# Patient Record
Sex: FEMALE | Race: BLACK OR AFRICAN AMERICAN | NOT HISPANIC OR LATINO | Employment: OTHER | URBAN - METROPOLITAN AREA
[De-identification: names, ages, dates, MRNs, and addresses within clinical notes are randomized per-mention and may not be internally consistent; named-entity substitution may affect disease eponyms.]

---

## 2017-10-02 ENCOUNTER — HOSPITAL ENCOUNTER (INPATIENT)
Facility: HOSPITAL | Age: 32
LOS: 1 days | Discharge: HOME | DRG: 640 | End: 2017-10-03
Attending: EMERGENCY MEDICINE | Admitting: FAMILY MEDICINE
Payer: MEDICARE

## 2017-10-02 DIAGNOSIS — R18.8 CIRRHOSIS OF LIVER WITH ASCITES, UNSPECIFIED HEPATIC CIRRHOSIS TYPE: ICD-10-CM

## 2017-10-02 DIAGNOSIS — I10 ESSENTIAL HYPERTENSION: ICD-10-CM

## 2017-10-02 DIAGNOSIS — Z99.2 ESRD ON HEMODIALYSIS: ICD-10-CM

## 2017-10-02 DIAGNOSIS — R79.89 ELEVATED TROPONIN: ICD-10-CM

## 2017-10-02 DIAGNOSIS — B20 HIV (HUMAN IMMUNODEFICIENCY VIRUS INFECTION): ICD-10-CM

## 2017-10-02 DIAGNOSIS — K74.60 CIRRHOSIS OF LIVER WITH ASCITES, UNSPECIFIED HEPATIC CIRRHOSIS TYPE: ICD-10-CM

## 2017-10-02 DIAGNOSIS — J18.9 HCAP (HEALTHCARE-ASSOCIATED PNEUMONIA): ICD-10-CM

## 2017-10-02 DIAGNOSIS — R56.9 SEIZURES: Primary | ICD-10-CM

## 2017-10-02 DIAGNOSIS — N18.6 ESRD ON HEMODIALYSIS: ICD-10-CM

## 2017-10-02 LAB
ALBUMIN SERPL BCP-MCNC: 3.2 G/DL
ALP SERPL-CCNC: 218 U/L
ALT SERPL W/O P-5'-P-CCNC: 15 U/L
ANION GAP SERPL CALC-SCNC: 15 MMOL/L
APTT BLDCRRT: 28.1 SEC
AST SERPL-CCNC: 35 U/L
BASOPHILS # BLD AUTO: 0.01 K/UL
BASOPHILS # BLD AUTO: 0.02 K/UL
BASOPHILS NFR BLD: 0.2 %
BASOPHILS NFR BLD: 0.3 %
BILIRUB SERPL-MCNC: 0.6 MG/DL
BNP SERPL-MCNC: >4900 PG/ML
BUN SERPL-MCNC: 25 MG/DL
CALCIUM SERPL-MCNC: 9.9 MG/DL
CHLORIDE SERPL-SCNC: 100 MMOL/L
CO2 SERPL-SCNC: 23 MMOL/L
CREAT SERPL-MCNC: 5.6 MG/DL
DIFFERENTIAL METHOD: ABNORMAL
DIFFERENTIAL METHOD: ABNORMAL
EOSINOPHIL # BLD AUTO: 0.1 K/UL
EOSINOPHIL # BLD AUTO: 0.1 K/UL
EOSINOPHIL NFR BLD: 1.7 %
EOSINOPHIL NFR BLD: 1.9 %
ERYTHROCYTE [DISTWIDTH] IN BLOOD BY AUTOMATED COUNT: 17.9 %
ERYTHROCYTE [DISTWIDTH] IN BLOOD BY AUTOMATED COUNT: 18.1 %
EST. GFR  (AFRICAN AMERICAN): 11 ML/MIN/1.73 M^2
EST. GFR  (NON AFRICAN AMERICAN): 9 ML/MIN/1.73 M^2
GLUCOSE SERPL-MCNC: 77 MG/DL
HCG INTACT+B SERPL-ACNC: <1.2 MIU/ML
HCT VFR BLD AUTO: 36.5 %
HCT VFR BLD AUTO: 38.5 %
HGB BLD-MCNC: 11.2 G/DL
HGB BLD-MCNC: 11.5 G/DL
INR PPP: 1.1
LACTATE SERPL-SCNC: 1.1 MMOL/L
LYMPHOCYTES # BLD AUTO: 1.2 K/UL
LYMPHOCYTES # BLD AUTO: 1.2 K/UL
LYMPHOCYTES NFR BLD: 17.7 %
LYMPHOCYTES NFR BLD: 18.5 %
MCH RBC QN AUTO: 26.7 PG
MCH RBC QN AUTO: 27 PG
MCHC RBC AUTO-ENTMCNC: 29.9 G/DL
MCHC RBC AUTO-ENTMCNC: 30.7 G/DL
MCV RBC AUTO: 88 FL
MCV RBC AUTO: 89 FL
MONOCYTES # BLD AUTO: 0.6 K/UL
MONOCYTES # BLD AUTO: 1 K/UL
MONOCYTES NFR BLD: 14.2 %
MONOCYTES NFR BLD: 9.9 %
NEUTROPHILS # BLD AUTO: 4.4 K/UL
NEUTROPHILS # BLD AUTO: 4.6 K/UL
NEUTROPHILS NFR BLD: 66.1 %
NEUTROPHILS NFR BLD: 69.5 %
PLATELET # BLD AUTO: 167 K/UL
PLATELET # BLD AUTO: 178 K/UL
PMV BLD AUTO: 10.2 FL
PMV BLD AUTO: 10.3 FL
POTASSIUM SERPL-SCNC: 5 MMOL/L
PROT SERPL-MCNC: 8.3 G/DL
PROTHROMBIN TIME: 11.7 SEC
RBC # BLD AUTO: 4.15 M/UL
RBC # BLD AUTO: 4.31 M/UL
SODIUM SERPL-SCNC: 138 MMOL/L
TROPONIN I SERPL DL<=0.01 NG/ML-MCNC: 0.04 NG/ML
TROPONIN I SERPL DL<=0.01 NG/ML-MCNC: 0.05 NG/ML
TROPONIN I SERPL DL<=0.01 NG/ML-MCNC: 0.05 NG/ML
WBC # BLD AUTO: 6.38 K/UL
WBC # BLD AUTO: 6.96 K/UL

## 2017-10-02 PROCEDURE — 80100016 HC MAINTENANCE HEMODIALYSIS

## 2017-10-02 PROCEDURE — 93010 ELECTROCARDIOGRAM REPORT: CPT | Mod: ,,, | Performed by: INTERNAL MEDICINE

## 2017-10-02 PROCEDURE — 63600175 PHARM REV CODE 636 W HCPCS: Performed by: INTERNAL MEDICINE

## 2017-10-02 PROCEDURE — S0039 INJECTION, SULFAMETHOXAZOLE: HCPCS | Performed by: EMERGENCY MEDICINE

## 2017-10-02 PROCEDURE — 87040 BLOOD CULTURE FOR BACTERIA: CPT | Mod: 59

## 2017-10-02 PROCEDURE — 96365 THER/PROPH/DIAG IV INF INIT: CPT

## 2017-10-02 PROCEDURE — 83605 ASSAY OF LACTIC ACID: CPT

## 2017-10-02 PROCEDURE — 63600175 PHARM REV CODE 636 W HCPCS: Performed by: FAMILY MEDICINE

## 2017-10-02 PROCEDURE — 96375 TX/PRO/DX INJ NEW DRUG ADDON: CPT

## 2017-10-02 PROCEDURE — 96366 THER/PROPH/DIAG IV INF ADDON: CPT

## 2017-10-02 PROCEDURE — 63600175 PHARM REV CODE 636 W HCPCS: Performed by: EMERGENCY MEDICINE

## 2017-10-02 PROCEDURE — 85610 PROTHROMBIN TIME: CPT

## 2017-10-02 PROCEDURE — 99223 1ST HOSP IP/OBS HIGH 75: CPT | Mod: 25,,, | Performed by: INTERNAL MEDICINE

## 2017-10-02 PROCEDURE — 84702 CHORIONIC GONADOTROPIN TEST: CPT

## 2017-10-02 PROCEDURE — 96367 TX/PROPH/DG ADDL SEQ IV INF: CPT

## 2017-10-02 PROCEDURE — 25000003 PHARM REV CODE 250: Performed by: EMERGENCY MEDICINE

## 2017-10-02 PROCEDURE — 25000003 PHARM REV CODE 250: Performed by: FAMILY MEDICINE

## 2017-10-02 PROCEDURE — 93010 ELECTROCARDIOGRAM REPORT: CPT | Mod: 76,,, | Performed by: INTERNAL MEDICINE

## 2017-10-02 PROCEDURE — 25000003 PHARM REV CODE 250: Performed by: NURSE PRACTITIONER

## 2017-10-02 PROCEDURE — 85025 COMPLETE CBC W/AUTO DIFF WBC: CPT | Mod: 91

## 2017-10-02 PROCEDURE — 83880 ASSAY OF NATRIURETIC PEPTIDE: CPT

## 2017-10-02 PROCEDURE — 63600175 PHARM REV CODE 636 W HCPCS: Performed by: NURSE PRACTITIONER

## 2017-10-02 PROCEDURE — 80053 COMPREHEN METABOLIC PANEL: CPT

## 2017-10-02 PROCEDURE — 90935 HEMODIALYSIS ONE EVALUATION: CPT | Mod: ,,, | Performed by: INTERNAL MEDICINE

## 2017-10-02 PROCEDURE — 85730 THROMBOPLASTIN TIME PARTIAL: CPT

## 2017-10-02 PROCEDURE — 36415 COLL VENOUS BLD VENIPUNCTURE: CPT

## 2017-10-02 PROCEDURE — 84484 ASSAY OF TROPONIN QUANT: CPT | Mod: 91

## 2017-10-02 PROCEDURE — 21400001 HC TELEMETRY ROOM

## 2017-10-02 PROCEDURE — 99285 EMERGENCY DEPT VISIT HI MDM: CPT | Mod: 25

## 2017-10-02 PROCEDURE — 86361 T CELL ABSOLUTE COUNT: CPT

## 2017-10-02 RX ORDER — PANTOPRAZOLE SODIUM 40 MG/1
40 TABLET, DELAYED RELEASE ORAL DAILY
COMMUNITY

## 2017-10-02 RX ORDER — ABACAVIR 300 MG/1
600 TABLET ORAL DAILY
Status: DISCONTINUED | OUTPATIENT
Start: 2017-10-02 | End: 2017-10-03 | Stop reason: HOSPADM

## 2017-10-02 RX ORDER — LEVETIRACETAM 500 MG/1
500 TABLET ORAL 2 TIMES DAILY
Status: DISCONTINUED | OUTPATIENT
Start: 2017-10-02 | End: 2017-10-03 | Stop reason: HOSPADM

## 2017-10-02 RX ORDER — BISACODYL 5 MG
10 TABLET, DELAYED RELEASE (ENTERIC COATED) ORAL ONCE
Status: COMPLETED | OUTPATIENT
Start: 2017-10-02 | End: 2017-10-02

## 2017-10-02 RX ORDER — TENOFOVIR DISOPROXIL FUMARATE 300 MG/1
300 TABLET, FILM COATED ORAL DAILY
COMMUNITY

## 2017-10-02 RX ORDER — ALPRAZOLAM 1 MG/1
1 TABLET ORAL 2 TIMES DAILY
Status: DISCONTINUED | OUTPATIENT
Start: 2017-10-02 | End: 2017-10-03 | Stop reason: HOSPADM

## 2017-10-02 RX ORDER — ONDANSETRON 2 MG/ML
4 INJECTION INTRAMUSCULAR; INTRAVENOUS EVERY 8 HOURS PRN
Status: DISCONTINUED | OUTPATIENT
Start: 2017-10-02 | End: 2017-10-03 | Stop reason: HOSPADM

## 2017-10-02 RX ORDER — CEFEPIME HYDROCHLORIDE 1 G/50ML
1 INJECTION, SOLUTION INTRAVENOUS
Status: DISCONTINUED | OUTPATIENT
Start: 2017-10-03 | End: 2017-10-03 | Stop reason: HOSPADM

## 2017-10-02 RX ORDER — ONDANSETRON 2 MG/ML
4 INJECTION INTRAMUSCULAR; INTRAVENOUS EVERY 8 HOURS PRN
Status: DISCONTINUED | OUTPATIENT
Start: 2017-10-02 | End: 2017-10-02

## 2017-10-02 RX ORDER — TRAZODONE HYDROCHLORIDE 100 MG/1
100 TABLET ORAL NIGHTLY
COMMUNITY

## 2017-10-02 RX ORDER — ALPRAZOLAM 1 MG/1
1 TABLET ORAL 2 TIMES DAILY
Status: ON HOLD | COMMUNITY
End: 2017-10-03

## 2017-10-02 RX ORDER — ABACAVIR 300 MG/1
600 TABLET ORAL DAILY
COMMUNITY

## 2017-10-02 RX ORDER — ENOXAPARIN SODIUM 100 MG/ML
40 INJECTION SUBCUTANEOUS EVERY 24 HOURS
Status: DISCONTINUED | OUTPATIENT
Start: 2017-10-02 | End: 2017-10-02

## 2017-10-02 RX ORDER — TENOFOVIR DISOPROXIL FUMARATE 300 MG/1
300 TABLET, FILM COATED ORAL DAILY
Status: DISCONTINUED | OUTPATIENT
Start: 2017-10-02 | End: 2017-10-03 | Stop reason: HOSPADM

## 2017-10-02 RX ORDER — HYDROCODONE BITARTRATE AND ACETAMINOPHEN 10; 325 MG/1; MG/1
1 TABLET ORAL EVERY 6 HOURS PRN
Status: DISCONTINUED | OUTPATIENT
Start: 2017-10-02 | End: 2017-10-03 | Stop reason: HOSPADM

## 2017-10-02 RX ORDER — HYDRALAZINE HYDROCHLORIDE 20 MG/ML
10 INJECTION INTRAMUSCULAR; INTRAVENOUS EVERY 6 HOURS PRN
Status: DISCONTINUED | OUTPATIENT
Start: 2017-10-02 | End: 2017-10-03 | Stop reason: HOSPADM

## 2017-10-02 RX ORDER — LABETALOL 300 MG/1
300 TABLET, FILM COATED ORAL 2 TIMES DAILY
COMMUNITY

## 2017-10-02 RX ORDER — MORPHINE SULFATE 2 MG/ML
2 INJECTION, SOLUTION INTRAMUSCULAR; INTRAVENOUS ONCE
Status: COMPLETED | OUTPATIENT
Start: 2017-10-02 | End: 2017-10-02

## 2017-10-02 RX ORDER — FAMOTIDINE 20 MG/50ML
20 INJECTION, SOLUTION INTRAVENOUS DAILY
Status: DISCONTINUED | OUTPATIENT
Start: 2017-10-02 | End: 2017-10-02 | Stop reason: SDUPTHER

## 2017-10-02 RX ORDER — CEFEPIME HYDROCHLORIDE 2 G/50ML
2 INJECTION, SOLUTION INTRAVENOUS
Status: COMPLETED | OUTPATIENT
Start: 2017-10-02 | End: 2017-10-02

## 2017-10-02 RX ORDER — MORPHINE SULFATE 2 MG/ML
2 INJECTION, SOLUTION INTRAMUSCULAR; INTRAVENOUS EVERY 4 HOURS PRN
Status: DISCONTINUED | OUTPATIENT
Start: 2017-10-02 | End: 2017-10-03 | Stop reason: HOSPADM

## 2017-10-02 RX ORDER — ACETAMINOPHEN 325 MG/1
975 TABLET ORAL
Status: DISCONTINUED | OUTPATIENT
Start: 2017-10-02 | End: 2017-10-02

## 2017-10-02 RX ORDER — VALSARTAN 80 MG/1
320 TABLET ORAL DAILY
Status: DISCONTINUED | OUTPATIENT
Start: 2017-10-02 | End: 2017-10-03 | Stop reason: HOSPADM

## 2017-10-02 RX ORDER — PROMETHAZINE HYDROCHLORIDE 25 MG/ML
25 INJECTION, SOLUTION INTRAMUSCULAR; INTRAVENOUS EVERY 6 HOURS PRN
Status: DISCONTINUED | OUTPATIENT
Start: 2017-10-02 | End: 2017-10-02

## 2017-10-02 RX ORDER — HEPARIN SODIUM 5000 [USP'U]/ML
5000 INJECTION, SOLUTION INTRAVENOUS; SUBCUTANEOUS EVERY 8 HOURS
Status: DISCONTINUED | OUTPATIENT
Start: 2017-10-02 | End: 2017-10-03 | Stop reason: HOSPADM

## 2017-10-02 RX ORDER — TRAZODONE HYDROCHLORIDE 100 MG/1
100 TABLET ORAL NIGHTLY
Status: DISCONTINUED | OUTPATIENT
Start: 2017-10-02 | End: 2017-10-03 | Stop reason: HOSPADM

## 2017-10-02 RX ORDER — FUROSEMIDE 10 MG/ML
20 INJECTION INTRAMUSCULAR; INTRAVENOUS ONCE
Status: COMPLETED | OUTPATIENT
Start: 2017-10-02 | End: 2017-10-02

## 2017-10-02 RX ORDER — ACETAMINOPHEN 325 MG/1
650 TABLET ORAL EVERY 6 HOURS PRN
Status: DISCONTINUED | OUTPATIENT
Start: 2017-10-02 | End: 2017-10-03 | Stop reason: HOSPADM

## 2017-10-02 RX ORDER — IBUPROFEN 400 MG/1
400 TABLET ORAL
Status: COMPLETED | OUTPATIENT
Start: 2017-10-02 | End: 2017-10-02

## 2017-10-02 RX ORDER — METOPROLOL TARTRATE 50 MG/1
50 TABLET ORAL 2 TIMES DAILY
Status: DISCONTINUED | OUTPATIENT
Start: 2017-10-02 | End: 2017-10-02

## 2017-10-02 RX ORDER — CINACALCET 30 MG/1
30 TABLET, FILM COATED ORAL
COMMUNITY

## 2017-10-02 RX ORDER — LEVETIRACETAM 500 MG/1
500 TABLET ORAL 2 TIMES DAILY
COMMUNITY

## 2017-10-02 RX ORDER — HEPARIN SODIUM 1000 [USP'U]/ML
2000 INJECTION, SOLUTION INTRAVENOUS; SUBCUTANEOUS ONCE
Status: COMPLETED | OUTPATIENT
Start: 2017-10-02 | End: 2017-10-02

## 2017-10-02 RX ORDER — MORPHINE SULFATE 4 MG/ML
4 INJECTION, SOLUTION INTRAMUSCULAR; INTRAVENOUS
Status: COMPLETED | OUTPATIENT
Start: 2017-10-02 | End: 2017-10-02

## 2017-10-02 RX ORDER — SYRING-NEEDL,DISP,INSUL,0.3 ML 29 G X1/2"
296 SYRINGE, EMPTY DISPOSABLE MISCELLANEOUS ONCE
Status: COMPLETED | OUTPATIENT
Start: 2017-10-02 | End: 2017-10-02

## 2017-10-02 RX ORDER — PANTOPRAZOLE SODIUM 40 MG/1
40 TABLET, DELAYED RELEASE ORAL DAILY
Status: DISCONTINUED | OUTPATIENT
Start: 2017-10-02 | End: 2017-10-03 | Stop reason: HOSPADM

## 2017-10-02 RX ORDER — AMLODIPINE BESYLATE 5 MG/1
5 TABLET ORAL DAILY
Status: DISCONTINUED | OUTPATIENT
Start: 2017-10-02 | End: 2017-10-03 | Stop reason: HOSPADM

## 2017-10-02 RX ORDER — VALSARTAN 320 MG/1
320 TABLET ORAL DAILY
COMMUNITY

## 2017-10-02 RX ORDER — SULFAMETHOXAZOLE AND TRIMETHOPRIM 400; 80 MG/1; MG/1
1 TABLET ORAL DAILY
Status: DISCONTINUED | OUTPATIENT
Start: 2017-10-02 | End: 2017-10-03 | Stop reason: HOSPADM

## 2017-10-02 RX ADMIN — VANCOMYCIN HYDROCHLORIDE 1000 MG: 1 INJECTION, POWDER, LYOPHILIZED, FOR SOLUTION INTRAVENOUS at 07:10

## 2017-10-02 RX ADMIN — MORPHINE SULFATE 4 MG: 4 INJECTION, SOLUTION INTRAMUSCULAR; INTRAVENOUS at 08:10

## 2017-10-02 RX ADMIN — HEPARIN SODIUM 5000 UNITS: 5000 INJECTION, SOLUTION INTRAVENOUS; SUBCUTANEOUS at 01:10

## 2017-10-02 RX ADMIN — AMLODIPINE BESYLATE 5 MG: 5 TABLET ORAL at 04:10

## 2017-10-02 RX ADMIN — LABETALOL HCL 300 MG: 100 TABLET, FILM COATED ORAL at 02:10

## 2017-10-02 RX ADMIN — ALPRAZOLAM 1 MG: 1 TABLET ORAL at 09:10

## 2017-10-02 RX ADMIN — VALSARTAN 320 MG: 80 TABLET, FILM COATED ORAL at 01:10

## 2017-10-02 RX ADMIN — MORPHINE SULFATE 2 MG: 2 INJECTION, SOLUTION INTRAMUSCULAR; INTRAVENOUS at 04:10

## 2017-10-02 RX ADMIN — SULFAMETHOXAZOLE AND TRIMETHOPRIM 310 MG: 80; 16 INJECTION, SOLUTION, CONCENTRATE INTRAVENOUS at 05:10

## 2017-10-02 RX ADMIN — LEVETIRACETAM 500 MG: 500 TABLET, FILM COATED ORAL at 01:10

## 2017-10-02 RX ADMIN — PROMETHAZINE HYDROCHLORIDE 25 MG: 25 INJECTION INTRAMUSCULAR; INTRAVENOUS at 01:10

## 2017-10-02 RX ADMIN — LABETALOL HCL 300 MG: 100 TABLET, FILM COATED ORAL at 09:10

## 2017-10-02 RX ADMIN — HYDROCODONE BITARTRATE AND ACETAMINOPHEN 1 TABLET: 10; 325 TABLET ORAL at 01:10

## 2017-10-02 RX ADMIN — PROMETHAZINE HYDROCHLORIDE 25 MG: 25 INJECTION INTRAMUSCULAR; INTRAVENOUS at 08:10

## 2017-10-02 RX ADMIN — RALTEGRAVIR 400 MG: 400 TABLET, FILM COATED ORAL at 09:10

## 2017-10-02 RX ADMIN — BISACODYL 10 MG: 5 TABLET, COATED ORAL at 04:10

## 2017-10-02 RX ADMIN — MAGNESIUM CITRATE 296 ML: 1.75 LIQUID ORAL at 05:10

## 2017-10-02 RX ADMIN — MORPHINE SULFATE 2 MG: 2 INJECTION, SOLUTION INTRAMUSCULAR; INTRAVENOUS at 02:10

## 2017-10-02 RX ADMIN — ERYTHROPOIETIN 10000 UNITS: 10000 INJECTION, SOLUTION INTRAVENOUS; SUBCUTANEOUS at 09:10

## 2017-10-02 RX ADMIN — HYDRALAZINE HYDROCHLORIDE 10 MG: 20 INJECTION INTRAMUSCULAR; INTRAVENOUS at 04:10

## 2017-10-02 RX ADMIN — TRAZODONE HYDROCHLORIDE 100 MG: 100 TABLET ORAL at 09:10

## 2017-10-02 RX ADMIN — TENOFOVIR DISOPROXIL FUMARATE 300 MG: 300 TABLET, COATED ORAL at 01:10

## 2017-10-02 RX ADMIN — IBUPROFEN 400 MG: 400 TABLET, FILM COATED ORAL at 04:10

## 2017-10-02 RX ADMIN — MORPHINE SULFATE 2 MG: 2 INJECTION, SOLUTION INTRAMUSCULAR; INTRAVENOUS at 08:10

## 2017-10-02 RX ADMIN — HEPARIN SODIUM 2000 UNITS: 1000 INJECTION, SOLUTION INTRAVENOUS; SUBCUTANEOUS at 08:10

## 2017-10-02 RX ADMIN — PANTOPRAZOLE SODIUM 40 MG: 40 TABLET, DELAYED RELEASE ORAL at 01:10

## 2017-10-02 RX ADMIN — RALTEGRAVIR 400 MG: 400 TABLET, FILM COATED ORAL at 01:10

## 2017-10-02 RX ADMIN — SULFAMETHOXAZOLE AND TRIMETHOPRIM 1 TABLET: 400; 80 TABLET ORAL at 04:10

## 2017-10-02 RX ADMIN — ALPRAZOLAM 1 MG: 1 TABLET ORAL at 01:10

## 2017-10-02 RX ADMIN — MORPHINE SULFATE 2 MG: 2 INJECTION, SOLUTION INTRAMUSCULAR; INTRAVENOUS at 11:10

## 2017-10-02 RX ADMIN — CEFEPIME HYDROCHLORIDE 2 G: 2 INJECTION, SOLUTION INTRAVENOUS at 04:10

## 2017-10-02 RX ADMIN — FUROSEMIDE 20 MG: 10 INJECTION, SOLUTION INTRAMUSCULAR; INTRAVENOUS at 07:10

## 2017-10-02 RX ADMIN — METOPROLOL TARTRATE 50 MG: 50 TABLET ORAL at 06:10

## 2017-10-02 RX ADMIN — HEPARIN SODIUM 5000 UNITS: 5000 INJECTION, SOLUTION INTRAVENOUS; SUBCUTANEOUS at 09:10

## 2017-10-02 RX ADMIN — ABACAVIR SULFATE 600 MG: 300 TABLET, FILM COATED ORAL at 01:10

## 2017-10-02 RX ADMIN — LEVETIRACETAM 500 MG: 500 TABLET, FILM COATED ORAL at 09:10

## 2017-10-02 NOTE — ED NOTES
Pt provided verbal consent to discuss care with "[employer]. [facility], updated on POC and pt status. RN reports will be faxing over all home meds and dialysis information. Will monitor for fax.

## 2017-10-02 NOTE — ED NOTES
Dr. Santiago at  to assess the patient. He states the patient needs dialysis he will call dialysis RN.

## 2017-10-02 NOTE — ED PROVIDER NOTES
SCRIBE #1 NOTE: I, Roma Lealo, am scribing for, and in the presence of, León Mccormack MD. I have scribed the entire note.      History      Chief Complaint   Patient presents with    Shortness of Breath     last dialysis was Saturday       Review of patient's allergies indicates:   Allergen Reactions    Pcn [penicillins]         HPI   HPI    10/2/2017, 2:24 AM   History obtained from the patient      History of Present Illness: Darcie Bryant is a 32 y.o. female patient with PMHx of seizure, HTN, and HIV who presents to the Emergency Department for SOB which onset gradually today. Per EMS, family called after pt had a seizure and fell out of the bed. Pt reports taking Keppra daily for seizures. Pt reports last being dialyzed 2 days ago and had 4.3L removed. Pt's nephrologist is Dr. Cho. Pt states she has dialysis on Tuesdays, Thursdays, and Saturdays. Symptoms are constant and moderate in severity. No mitigating or exacerbating factors reported. Associated sxs include R sided abd pain, HA, and abd distention. Patient denies any LOC, CP, extremity weakness/numbness, dizziness, cough, N/V, and all other sxs at this time. No further complaints or concerns at this time.     Arrival mode: EMS    PCP: Bobby Anand MD       Past Medical History:  Past Medical History:   Diagnosis Date    Anxiety     Cirrhosis     CKD (chronic kidney disease)     Dialysis patient     HIV (human immunodeficiency virus infection)     HTN (hypertension)     Seizures        Past Surgical History:  History reviewed. No pertinent surgical history.      Family History:  History reviewed. No pertinent family history.    Social History:  Social History     Social History Main Topics    Smoking status: Current Some Day Smoker    Smokeless tobacco: Never Used    Alcohol use Yes    Drug use: No    Sexual activity: Unknown       ROS   Review of Systems   Constitutional: Negative for fever.   HENT: Negative for sore throat.     Respiratory: Positive for shortness of breath. Negative for cough.    Cardiovascular: Negative for chest pain.   Gastrointestinal: Positive for abdominal distention and abdominal pain (right sided). Negative for nausea and vomiting.   Genitourinary: Negative for dysuria.   Musculoskeletal: Negative for back pain.   Skin: Negative for rash.   Neurological: Positive for seizures and headaches. Negative for dizziness, weakness and numbness.        (-) LOC   Hematological: Does not bruise/bleed easily.   All other systems reviewed and are negative.      Physical Exam      Initial Vitals [10/02/17 0129]   BP Pulse Resp Temp SpO2   (!) 209/155 88 20 99.8 °F (37.7 °C) 100 %      MAP       173          Physical Exam  Nursing Notes and Vital Signs Reviewed.  Constitutional: Patient is in no acute distress. Well-developed and well-nourished.  Head: Atraumatic. Normocephalic.  Eyes: PERRL. EOM intact. Conjunctivae are not pale. No scleral icterus.  ENT: Mucous membranes are moist. Oropharynx is clear and symmetric.    Neck: Supple. Full ROM. No lymphadenopathy.  Cardiovascular: Tachycardic. Regular rhythm. Crescendo systolic murmur heard over L sternal border. No rubs, or gallops. Distal pulses are 2+ and symmetric.  Pulmonary/Chest: Tachypneic. Breath sounds equivocal. Clear to auscultation bilaterally. No wheezing, rales, or rhonchi.  Abdominal: Abd distended with shifting dullness.  There is no tenderness.  No rebound, guarding, or rigidity. Good bowel sounds.  Genitourinary: No CVA tenderness  Musculoskeletal: Moves all extremities. No obvious deformities. No edema. No calf tenderness.  Skin: Warm and dry.  Neurological:  Alert, awake, and appropriate.  Normal speech.  No acute focal neurological deficits are appreciated.  Psychiatric: Normal affect. Good eye contact. Appropriate in content.    ED Course    Procedures  ED Vital Signs:  Vitals:    10/02/17 0129 10/02/17 0239 10/02/17 0245 10/02/17 0345   BP: (!) 209/155   "(!) 179/119 (!) 167/134   Pulse: 88 104 106 96   Resp: 20  20 (!) 28   Temp: 99.8 °F (37.7 °C)      TempSrc: Oral      SpO2: 100%  98% 100%   Weight: 61.9 kg (136 lb 7.4 oz)      Height: 5' 6" (1.676 m)       10/02/17 0430   BP: (!) 180/110   Pulse: 95   Resp: (!) 24   Temp:    TempSrc:    SpO2: 96%   Weight:    Height:        Abnormal Lab Results:  Labs Reviewed   B-TYPE NATRIURETIC PEPTIDE - Abnormal; Notable for the following:        Result Value    BNP >4,900 (*)     All other components within normal limits   CBC W/ AUTO DIFFERENTIAL - Abnormal; Notable for the following:     Hemoglobin 11.5 (*)     MCH 26.7 (*)     MCHC 29.9 (*)     RDW 18.1 (*)     All other components within normal limits   COMPREHENSIVE METABOLIC PANEL - Abnormal; Notable for the following:     BUN, Bld 25 (*)     Creatinine 5.6 (*)     Albumin 3.2 (*)     Alkaline Phosphatase 218 (*)     eGFR if  11 (*)     eGFR if non  9 (*)     All other components within normal limits   TROPONIN I - Abnormal; Notable for the following:     Troponin I 0.036 (*)     All other components within normal limits   CULTURE, BLOOD   CULTURE, BLOOD   HCG, QUANTITATIVE, PREGNANCY   T-HELPER CELLS (CD4) COUNT   PROTIME-INR   APTT   LACTIC ACID, PLASMA        All Lab Results:  Results for orders placed or performed during the hospital encounter of 10/02/17   Brain natriuretic peptide   Result Value Ref Range    BNP >4,900 (H) 0 - 99 pg/mL   CBC auto differential   Result Value Ref Range    WBC 6.38 3.90 - 12.70 K/uL    RBC 4.31 4.00 - 5.40 M/uL    Hemoglobin 11.5 (L) 12.0 - 16.0 g/dL    Hematocrit 38.5 37.0 - 48.5 %    MCV 89 82 - 98 fL    MCH 26.7 (L) 27.0 - 31.0 pg    MCHC 29.9 (L) 32.0 - 36.0 g/dL    RDW 18.1 (H) 11.5 - 14.5 %    Platelets 167 150 - 350 K/uL    MPV 10.3 9.2 - 12.9 fL    Gran # 4.4 1.8 - 7.7 K/uL    Lymph # 1.2 1.0 - 4.8 K/uL    Mono # 0.6 0.3 - 1.0 K/uL    Eos # 0.1 0.0 - 0.5 K/uL    Baso # 0.01 0.00 - 0.20 K/uL    " "Gran% 69.5 38.0 - 73.0 %    Lymph% 18.5 18.0 - 48.0 %    Mono% 9.9 4.0 - 15.0 %    Eosinophil% 1.9 0.0 - 8.0 %    Basophil% 0.2 0.0 - 1.9 %    Differential Method Automated    Comprehensive metabolic panel   Result Value Ref Range    Sodium 138 136 - 145 mmol/L    Potassium 5.0 3.5 - 5.1 mmol/L    Chloride 100 95 - 110 mmol/L    CO2 23 23 - 29 mmol/L    Glucose 77 70 - 110 mg/dL    BUN, Bld 25 (H) 6 - 20 mg/dL    Creatinine 5.6 (H) 0.5 - 1.4 mg/dL    Calcium 9.9 8.7 - 10.5 mg/dL    Total Protein 8.3 6.0 - 8.4 g/dL    Albumin 3.2 (L) 3.5 - 5.2 g/dL    Total Bilirubin 0.6 0.1 - 1.0 mg/dL    Alkaline Phosphatase 218 (H) 55 - 135 U/L    AST 35 10 - 40 U/L    ALT 15 10 - 44 U/L    Anion Gap 15 8 - 16 mmol/L    eGFR if African American 11 (A) >60 mL/min/1.73 m^2    eGFR if non African American 9 (A) >60 mL/min/1.73 m^2   Troponin I   Result Value Ref Range    Troponin I 0.036 (H) 0.000 - 0.026 ng/mL   hCG, quantitative, pregnancy   Result Value Ref Range    hCG Quant <1.2 See Text mIU/mL       Imaging Results:  Imaging Results          X-Ray Chest AP Portable (In process)                Ordered, reviewed, and independently interpreted by the ED provider.  Study: X-ray Chest  Findings: R lower lobe infiltrate suggestive of pneumonia    The EKG was ordered, reviewed, and independently interpreted by the ED provider.  Interpretation time: 0239  Rate: 104 BPM  Rhythm: sinus tachycardia  Interpretation: Possible left atrial enlargement. Left ventricular hypertrophy. Abnormal ECG. No STEMI.         The Emergency Provider reviewed the vital signs and test results, which are outlined above.    ED Discussion     4:40 AM: Re-evaluated pt. Pt is resting comfortably and is in no acute distress.  Pt states she was seen a month ago at [facility] and was told she had a "low" CD4 count at the time. Pt states she is compliant with antiretroviral medications. Pt denies recent abx use. Pt states she was last admitted to the hospital a month " ago.  D/w pt all pertinent results. D/w pt any concerns expressed at this time. Answered all questions. Pt expresses understanding at this time.    4:51 AM: Discussed case with Yao Wang NP ([facility]), agrees with current care and management of pt and accepts admission.   Admitting Service: Hospital medicine   Admitting Physician: Dr. De Los Santos  Admit to: Inpatient tele    5:25 AM: Re-evaluated pt. I have discussed test results, shared treatment plan, and the need for admission with patient and family at bedside. Pt and family express understanding at this time and agree with all information. All questions answered. Pt and family have no further questions or concerns at this time. Pt is ready for admit.  Will initiate Vanco/Cefepime given recent hospitalization and Bactrim given unknown status of HIV.  Patient will need ABX dosed for dialysis.      ED Medication(s):  Medications   sulfamethoxazole-trimethoprim 400-80 mg/5 mL (BACTRIM) 310 mg in dextrose 5 % 310 mL IVPB (not administered)   vancomycin 1 g in dextrose 5 % 250 mL IVPB (ready to mix system) (not administered)   ceFEPIme in dextrose 5% 2 gram/50 mL IVPB 2 g (2 g Intravenous New Bag 10/2/17 0457)   ibuprofen tablet 400 mg (400 mg Oral Given 10/2/17 0456)       New Prescriptions    No medications on file             Medical Decision Making    Medical Decision Making:   Clinical Tests:   Lab Tests: Ordered and Reviewed  Radiological Study: Ordered and Reviewed           Scribe Attestation:   Scribe #1: I performed the above scribed service and the documentation accurately describes the services I performed. I attest to the accuracy of the note.    Attending:   Physician Attestation Statement for Scribe #1: I, León Mccormack MD, personally performed the services described in this documentation, as scribed by Roma Wilburn, in my presence, and it is both accurate and complete.          Clinical Impression       ICD-10-CM ICD-9-CM   1. HIV (human  immunodeficiency virus infection) B20 V08   2. HCAP (healthcare-associated pneumonia) J18.9 486   3. ESRD on hemodialysis N18.6 585.6    Z99.2 V45.11   4. Cirrhosis of liver with ascites, unspecified hepatic cirrhosis type K74.60 571.5   5. Essential hypertension I10 401.9       Disposition:   Disposition: Admitted (Inpatient tele)  Condition: Kamille Mccormack MD  10/02/17 0558

## 2017-10-02 NOTE — ED TRIAGE NOTES
Pt called EMS for shortness of breath tonight.  Also has abdominal swelling for which she states she was told needed to be drained by her dialysis facility.

## 2017-10-02 NOTE — HPI
Patient is a 32-year-old female with history of HIV since 2006.  Has noted from [place].  Also has ESRD on hemodialysis on a Tuesday Thursday and [facility] care of Dr. Cho.  Patient's last dialysis was Saturday where she went and received for dialysis and had an ultrafiltration of 4.5 kg.  Patient has a right upper arm graft which was placed in [place] but has not been doing so well on that graft with recurrent bleeding and has been evaluated by Dr. Bosch.    Today patient being admitted with severe shortness of breath, PND orthopnea and CHF    10/03/2017  Patient feels better.  Complains of abdominal pain, neck pain and shoulder pain

## 2017-10-02 NOTE — SUBJECTIVE & OBJECTIVE
Past Medical History:   Diagnosis Date    Anxiety     Cirrhosis     CKD (chronic kidney disease)     Dialysis patient     HIV (human immunodeficiency virus infection)     HTN (hypertension)     Seizures        History reviewed. No pertinent surgical history.    Review of patient's allergies indicates:   Allergen Reactions    Pcn [penicillins]      Current Facility-Administered Medications   Medication Frequency    abacavir tablet 600 mg Daily    alprazolam tablet 1 mg BID    [START ON 10/3/2017] cefepime in dextrose 5 % 1 gram/50 mL IVPB 1 g Q24H    enoxaparin injection 40 mg Daily    epoetin aldo injection 10,000 Units Once    heparin (porcine) injection 2,000 Units Once    labetalol tablet 300 mg BID    levetiracetam tablet 500 mg BID    metoprolol tartrate (LOPRESSOR) tablet 50 mg BID    pantoprazole EC tablet 40 mg Daily    raltegravir tablet 400 mg BID    sulfamethoxazole-trimethoprim 400-80 mg/5 mL (BACTRIM) 310 mg in dextrose 5 % 310 mL IVPB Q8H    tenofovir tablet 300 mg Daily    trazodone tablet 100 mg QHS    valsartan tablet 320 mg Daily     Family History     None        Social History Main Topics    Smoking status: Current Some Day Smoker    Smokeless tobacco: Never Used    Alcohol use Yes    Drug use: No    Sexual activity: Not on file     Review of Systems   Constitutional: Negative for activity change, appetite change, chills, diaphoresis, fatigue, fever and unexpected weight change.   HENT: Negative for congestion, dental problem, drooling, postnasal drip, rhinorrhea and voice change.    Eyes: Negative for discharge.   Respiratory: Positive for cough, shortness of breath and wheezing. Negative for apnea, choking, chest tightness and stridor.    Cardiovascular: Negative for chest pain, palpitations and leg swelling.   Gastrointestinal: Positive for abdominal distention and abdominal pain. Negative for blood in stool, constipation, diarrhea, nausea, rectal pain and vomiting.    Endocrine: Negative for cold intolerance, heat intolerance, polydipsia and polyuria.   Genitourinary: Negative for decreased urine volume, difficulty urinating, dysuria, enuresis, flank pain, frequency, hematuria and urgency.   Musculoskeletal: Negative for arthralgias, back pain, gait problem and joint swelling.   Skin: Negative for rash.   Allergic/Immunologic: Negative for food allergies and immunocompromised state.   Neurological: Negative for dizziness, tremors, syncope, numbness and headaches.   Hematological: Does not bruise/bleed easily.   Psychiatric/Behavioral: Negative for agitation, behavioral problems and self-injury. The patient is not nervous/anxious and is not hyperactive.    All other systems reviewed and are negative.    Objective:     Vital Signs (Most Recent):  Temp: 98.5 °F (36.9 °C) (10/02/17 0820)  Pulse: 78 (10/02/17 0900)  Resp: 20 (10/02/17 0900)  BP: (!) 175/108 (10/02/17 0900)  SpO2: 96 % (10/02/17 0809) Vital Signs (24h Range):  Temp:  [98.5 °F (36.9 °C)-99.8 °F (37.7 °C)] 98.5 °F (36.9 °C)  Pulse:  [] 78  Resp:  [20-28] 20  SpO2:  [94 %-100 %] 96 %  BP: (164-218)/(103-155) 175/108     Weight: 61.9 kg (136 lb 7.4 oz) (10/02/17 0129)  Body mass index is 22.03 kg/m².  Body surface area is 1.7 meters squared.    No intake/output data recorded.    Physical Exam   Constitutional: She is oriented to person, place, and time. She appears well-developed and well-nourished.   HENT:   Head: Normocephalic and atraumatic.   Eyes: Conjunctivae and EOM are normal. Pupils are equal, round, and reactive to light.   Neck: Normal range of motion and full passive range of motion without pain. Neck supple. Carotid bruit is not present. No edema present. No thyroid mass and no thyromegaly present.   Cardiovascular: Normal rate, regular rhythm, S1 normal, S2 normal, intact distal pulses and normal pulses.   No extrasystoles are present. PMI is displaced.  Exam reveals no friction rub.    No murmur  heard.  Positive JVD RV heave loud P2    Pulmonary/Chest: Effort normal. No accessory muscle usage. No respiratory distress. She has no wheezes. She has no rales. She exhibits no tenderness.   Abdominal: Soft. Bowel sounds are normal. She exhibits distension and mass. There is no hepatosplenomegaly. There is tenderness. There is guarding. There is no rebound and no CVA tenderness. No hernia.   Liver is enlarged with severe tenderness but no rebound   Musculoskeletal: Normal range of motion. She exhibits no edema or tenderness.   Right upper arm graft does not look to be functioning well with multiple stenoses identified by physical examination but has a positive bruit and thrill   Neurological: She is alert and oriented to person, place, and time. She has normal reflexes. No cranial nerve deficit or sensory deficit. She exhibits normal muscle tone. Coordination normal.   Skin: Skin is warm and dry. No bruising, no ecchymosis and no rash noted. No cyanosis or erythema. No pallor. Nails show no clubbing.   Psychiatric: She has a normal mood and affect. Her speech is normal and behavior is normal. Judgment and thought content normal.       Significant Labs:  All labs within the past 24 hours have been reviewed.    Significant Imaging:  Labs: Reviewed  X-Ray: Reviewed  EKG

## 2017-10-02 NOTE — NURSING
Met ER in dialysis. Received bedside report form ER nurse. Monitor placed on patient. Receiving dialysis. Will cont to monitor

## 2017-10-02 NOTE — PLAN OF CARE
"CM met with pt for d/c planning assessment.  The pt states that prior to admission she was living at home with her aunt, who provides care and assistance as needed, and is "like a mother" to the pt.  The pt was independent with ADLs prior to admission but says she may benefit from a cane for use at home.  The pt moved to [place] from [place] in Nov. 2016 - she has had home health in the past and would be open to getting it again if possible.     The pt receives dialysis at [facility] on Tu, Th, Sat at 10:30.  Her aunt brings her to and from dialysis but pt acknowledges she occasionally misses appts due to not having a ride when aunt is unavailable.  The pt is requesting assistance with transportation and food if possible.  CM provided handoff to CM, and informed pt.  CM provided my health packet, advanced directives information, [employer] application for assistance with transportation, and contact info of CM to follow up with questions or concerns.         10/02/17 1167   Discharge Assessment   Assessment Type Discharge Planning Assessment   Assessment information obtained from? Patient;Medical Record   Expected Length of Stay (days) (TBD)   Prior to hospitilization cognitive status: Alert/Oriented   Prior to hospitalization functional status: Independent   Current cognitive status: Alert/Oriented   Current Functional Status: Independent   Lives With other relative(s)   Able to Return to Prior Arrangements yes   Is patient able to care for self after discharge? Yes   Who are your caregiver(s) and their phone number(s)? Lorena Braun, relative: [phone]   Patient's perception of discharge disposition home or selfcare   Readmission Within The Last 30 Days no previous admission in last 30 days   Patient currently being followed by outpatient case management? No   Patient currently receives any other outside agency services? No   Equipment Currently Used at Home none   Do you have any problems affording any of your prescribed " medications? No   Is the patient taking medications as prescribed? yes   Does the patient have transportation home? Yes   Transportation Available family or friend will provide   Dialysis Name and Scheduled days [facility] - T, Th, Sat   Discharge Plan A Home with family   Discharge Plan B Home with family   Patient/Family In Agreement With Plan yes

## 2017-10-02 NOTE — PLAN OF CARE
Problem: Patient Care Overview  Goal: Plan of Care Review  Outcome: Ongoing (interventions implemented as appropriate)  Pt c/o pain-medicated several times.  Pt did bite her tongue during seizure and states it hurts and also right upper abdomen- xray shows a moderate amount of stool- given ducolax and mag citrate.  4 L taken off in HD today and plan is for pt to go for HD again tomorrow.  BP remained high after HD- medications given.

## 2017-10-02 NOTE — PLAN OF CARE
Patient received hd for 4 hours today. Net removal 4 liters. No access problems. Tolerated well. Dr. Santiago visited during hd. Adm. epogen with hd as ordered

## 2017-10-02 NOTE — H&P
Chief complaint  SOB          HPI   Darcie Bryant is a 32 y.o. female patient with PMHx of seizure, HTN, and HIV who presents to the Emergency Department for SOB which onset gradually today. Per EMS, family called after pt had a seizure and fell out of the bed. Pt reports taking Keppra daily for seizures. Pt reports last being dialyzed 2 days ago and had 4.3L removed. Pt's nephrologist is Dr. Cho. Pt states she has dialysis on Tuesdays, Thursdays, and Saturdays. Symptoms are constant and moderate in severity. No mitigating or exacerbating factors reported. Associated sxs include R sided abd pain, HA, and abd distention. Patient denies any LOC, CP, extremity weakness/numbness, dizziness, cough, N/V, and all other sxs at this time. No further complaints or concerns at this time.           Review of patient's allergies indicates:   Allergen Reactions    Pcn [penicillins]           Past Medical History:       Past Medical History:   Diagnosis Date    Anxiety      Cirrhosis      CKD (chronic kidney disease)      Dialysis patient      HIV (human immunodeficiency virus infection)      HTN (hypertension)      Seizures           Social History:  Social History           Social History Main Topics    Smoking status: Current Some Day Smoker    Smokeless tobacco: Never Used    Alcohol use Yes    Drug use: No    Sexual activity: Unknown         ROS   Review of Systems   Constitutional: Negative for fever.   HENT: Negative for sore throat.    Respiratory: Positive for shortness of breath. Negative for cough.    Cardiovascular: Negative for chest pain.   Gastrointestinal: Positive for abdominal distention and abdominal pain (right sided). Negative for nausea and vomiting.   Genitourinary: Negative for dysuria.   Musculoskeletal: Negative for back pain.   Skin: Negative for rash.   Neurological: Positive for seizures and headaches. Negative for dizziness, weakness and numbness.        (-) LOC   Hematological: Does  "not bruise/bleed easily.   All other systems reviewed and are negative.        Physical Exam       Vital Signs:         Vitals:     10/02/17 0129 10/02/17 0239 10/02/17 0245 10/02/17 0345   BP: (!) 209/155   (!) 179/119 (!) 167/134   Pulse: 88 104 106 96   Resp: 20   20 (!) 28   Temp: 99.8 °F (37.7 °C)         TempSrc: Oral         SpO2: 100%   98% 100%   Weight: 61.9 kg (136 lb 7.4 oz)         Height: 5' 6" (1.676 m)           10/02/17 0430   BP: (!) 180/110   Pulse: 95   Resp: (!) 24   Temp:     TempSrc:     SpO2: 96%   Weight:     Height:        Physical Exam  Nursing Notes and Vital Signs Reviewed.  Constitutional: Patient is in no acute distress. Well-developed and well-nourished.  Head: Atraumatic. Normocephalic.  Eyes: PERRL. EOM intact. Conjunctivae are not pale. No scleral icterus.  ENT: Mucous membranes are moist. Oropharynx is clear and symmetric.    Neck: Supple. Full ROM. No lymphadenopathy.  Cardiovascular: Tachycardic. Regular rhythm. Crescendo systolic murmur heard over L sternal border. No rubs, or gallops. Distal pulses are 2+ and symmetric.  Pulmonary/Chest: Tachypneic. Breath sounds equivocal. Clear to auscultation bilaterally. No wheezing, rales, or rhonchi.  Abdominal: Abd distended with shifting dullness.  There is no tenderness.  No rebound, guarding, or rigidity. Good bowel sounds.  Genitourinary: No CVA tenderness  Musculoskeletal: Moves all extremities. No obvious deformities. No edema. No calf tenderness.  Skin: Warm and dry.  Neurological:  Alert, awake, and appropriate.  Normal speech.  No acute focal neurological deficits are appreciated.  Psychiatric: Normal affect. Good eye contact. Appropriate in content.        Abnormal Lab Results:        Labs Reviewed   B-TYPE NATRIURETIC PEPTIDE - Abnormal; Notable for the following:        Result Value      BNP >4,900 (*)       All other components within normal limits   CBC W/ AUTO DIFFERENTIAL - Abnormal; Notable for the following:      " Hemoglobin 11.5 (*)       MCH 26.7 (*)       MCHC 29.9 (*)       RDW 18.1 (*)       All other components within normal limits   COMPREHENSIVE METABOLIC PANEL - Abnormal; Notable for the following:      BUN, Bld 25 (*)       Creatinine 5.6 (*)       Albumin 3.2 (*)       Alkaline Phosphatase 218 (*)       eGFR if  11 (*)       eGFR if non  9 (*)       All other components within normal limits   TROPONIN I - Abnormal; Notable for the following:      Troponin I 0.036 (*)       All other components within normal limits   CULTURE, BLOOD   CULTURE, BLOOD   HCG, QUANTITATIVE, PREGNANCY   T-HELPER CELLS (CD4) COUNT   PROTIME-INR   APTT   LACTIC ACID, PLASMA         All Lab Results:        Results for orders placed or performed during the hospital encounter of 10/02/17   Brain natriuretic peptide   Result Value Ref Range     BNP >4,900 (H) 0 - 99 pg/mL   CBC auto differential   Result Value Ref Range     WBC 6.38 3.90 - 12.70 K/uL     RBC 4.31 4.00 - 5.40 M/uL     Hemoglobin 11.5 (L) 12.0 - 16.0 g/dL     Hematocrit 38.5 37.0 - 48.5 %     MCV 89 82 - 98 fL     MCH 26.7 (L) 27.0 - 31.0 pg     MCHC 29.9 (L) 32.0 - 36.0 g/dL     RDW 18.1 (H) 11.5 - 14.5 %     Platelets 167 150 - 350 K/uL     MPV 10.3 9.2 - 12.9 fL     Gran # 4.4 1.8 - 7.7 K/uL     Lymph # 1.2 1.0 - 4.8 K/uL     Mono # 0.6 0.3 - 1.0 K/uL     Eos # 0.1 0.0 - 0.5 K/uL     Baso # 0.01 0.00 - 0.20 K/uL     Gran% 69.5 38.0 - 73.0 %     Lymph% 18.5 18.0 - 48.0 %     Mono% 9.9 4.0 - 15.0 %     Eosinophil% 1.9 0.0 - 8.0 %     Basophil% 0.2 0.0 - 1.9 %     Differential Method Automated     Comprehensive metabolic panel   Result Value Ref Range     Sodium 138 136 - 145 mmol/L     Potassium 5.0 3.5 - 5.1 mmol/L     Chloride 100 95 - 110 mmol/L     CO2 23 23 - 29 mmol/L     Glucose 77 70 - 110 mg/dL     BUN, Bld 25 (H) 6 - 20 mg/dL     Creatinine 5.6 (H) 0.5 - 1.4 mg/dL     Calcium 9.9 8.7 - 10.5 mg/dL     Total Protein 8.3 6.0 - 8.4 g/dL      Albumin 3.2 (L) 3.5 - 5.2 g/dL     Total Bilirubin 0.6 0.1 - 1.0 mg/dL     Alkaline Phosphatase 218 (H) 55 - 135 U/L     AST 35 10 - 40 U/L     ALT 15 10 - 44 U/L     Anion Gap 15 8 - 16 mmol/L     eGFR if African American 11 (A) >60 mL/min/1.73 m^2     eGFR if non African American 9 (A) >60 mL/min/1.73 m^2   Troponin I   Result Value Ref Range     Troponin I 0.036 (H) 0.000 - 0.026 ng/mL   hCG, quantitative, pregnancy   Result Value Ref Range     hCG Quant <1.2 See Text mIU/mL         Imaging Results:      Imaging Results            X-Ray Chest AP Portable (In process)                   Ordered, reviewed, and independently interpreted by the ED provider.  Study: X-ray Chest  Findings: R lower lobe infiltrate suggestive of pneumonia     The EKG was ordered, reviewed, and independently interpreted by the ED provider.  Interpretation time: 0239  Rate: 104 BPM  Rhythm: sinus tachycardia  Interpretation: Possible left atrial enlargement. Left ventricular hypertrophy. Abnormal ECG. No STEMI.           Assessment      1. SOB  2. Pneumonia   3. HIV  4. ESRD  5. CHF?  6. HTN  7. Anemia   8. Abnormal LFTs  9.Elevated troponin    Plan:  Admit to tele, oxygen, cardiac monitor, series cardiac enzymes, IV antibiotic, ID consult, consult renal to keep dialysis schedule, diuretic, metoprolol for BP control and CHF management.

## 2017-10-02 NOTE — CONSULTS
[facility] -   Nephrology  Consult Note    Patient Name: aDrcie Bryant  MRN: [id number]  Admission Date: 10/2/2017  Hospital Length of Stay: 0 days  Attending Provider: Lakhwinder De Los Santos MD   Primary Care Physician: Bobby Anand MD  Principal Problem:<principal problem not specified>    Consults  Subjective:     HPI: Patient is a 32-year-old female with history of HIV since 2006.  Has noted from [place].  Also has ESRD on hemodialysis on a Tuesday Thursday and [facility] care of Dr. Cho.  Patient's last dialysis was Saturday where she went and received for dialysis and had an ultrafiltration of 4.5 kg.  Patient has a right upper arm graft which was placed in [place] but has not been doing so well on that graft with recurrent bleeding and has been evaluated by Dr. Bosch.    Today patient being admitted with severe shortness of breath, PND orthopnea and CHF    Past Medical History:   Diagnosis Date    Anxiety     Cirrhosis     CKD (chronic kidney disease)     Dialysis patient     HIV (human immunodeficiency virus infection)     HTN (hypertension)     Seizures        History reviewed. No pertinent surgical history.    Review of patient's allergies indicates:   Allergen Reactions    Pcn [penicillins]      Current Facility-Administered Medications   Medication Frequency    abacavir tablet 600 mg Daily    alprazolam tablet 1 mg BID    [START ON 10/3/2017] cefepime in dextrose 5 % 1 gram/50 mL IVPB 1 g Q24H    enoxaparin injection 40 mg Daily    epoetin aldo injection 10,000 Units Once    heparin (porcine) injection 2,000 Units Once    labetalol tablet 300 mg BID    levetiracetam tablet 500 mg BID    metoprolol tartrate (LOPRESSOR) tablet 50 mg BID    pantoprazole EC tablet 40 mg Daily    raltegravir tablet 400 mg BID    sulfamethoxazole-trimethoprim 400-80 mg/5 mL (BACTRIM) 310 mg in dextrose 5 % 310 mL IVPB Q8H    tenofovir tablet 300 mg Daily    trazodone  tablet 100 mg QHS    valsartan tablet 320 mg Daily     Family History     None        Social History Main Topics    Smoking status: Current Some Day Smoker    Smokeless tobacco: Never Used    Alcohol use Yes    Drug use: No    Sexual activity: Not on file     Review of Systems   Constitutional: Negative for activity change, appetite change, chills, diaphoresis, fatigue, fever and unexpected weight change.   HENT: Negative for congestion, dental problem, drooling, postnasal drip, rhinorrhea and voice change.    Eyes: Negative for discharge.   Respiratory: Positive for cough, shortness of breath and wheezing. Negative for apnea, choking, chest tightness and stridor.    Cardiovascular: Negative for chest pain, palpitations and leg swelling.   Gastrointestinal: Positive for abdominal distention and abdominal pain. Negative for blood in stool, constipation, diarrhea, nausea, rectal pain and vomiting.   Endocrine: Negative for cold intolerance, heat intolerance, polydipsia and polyuria.   Genitourinary: Negative for decreased urine volume, difficulty urinating, dysuria, enuresis, flank pain, frequency, hematuria and urgency.   Musculoskeletal: Negative for arthralgias, back pain, gait problem and joint swelling.   Skin: Negative for rash.   Allergic/Immunologic: Negative for food allergies and immunocompromised state.   Neurological: Negative for dizziness, tremors, syncope, numbness and headaches.   Hematological: Does not bruise/bleed easily.   Psychiatric/Behavioral: Negative for agitation, behavioral problems and self-injury. The patient is not nervous/anxious and is not hyperactive.    All other systems reviewed and are negative.    Objective:     Vital Signs (Most Recent):  Temp: 98.5 °F (36.9 °C) (10/02/17 0820)  Pulse: 78 (10/02/17 0900)  Resp: 20 (10/02/17 0900)  BP: (!) 175/108 (10/02/17 0900)  SpO2: 96 % (10/02/17 0809) Vital Signs (24h Range):  Temp:  [98.5 °F (36.9 °C)-99.8 °F (37.7 °C)] 98.5 °F (36.9  °C)  Pulse:  [] 78  Resp:  [20-28] 20  SpO2:  [94 %-100 %] 96 %  BP: (164-218)/(103-155) 175/108     Weight: 61.9 kg (136 lb 7.4 oz) (10/02/17 0129)  Body mass index is 22.03 kg/m².  Body surface area is 1.7 meters squared.    No intake/output data recorded.    Physical Exam   Constitutional: She is oriented to person, place, and time. She appears well-developed and well-nourished.   HENT:   Head: Normocephalic and atraumatic.   Eyes: Conjunctivae and EOM are normal. Pupils are equal, round, and reactive to light.   Neck: Normal range of motion and full passive range of motion without pain. Neck supple. Carotid bruit is not present. No edema present. No thyroid mass and no thyromegaly present.   Cardiovascular: Normal rate, regular rhythm, S1 normal, S2 normal, intact distal pulses and normal pulses.   No extrasystoles are present. PMI is displaced.  Exam reveals no friction rub.    No murmur heard.  Positive JVD RV heave loud P2    Pulmonary/Chest: Effort normal. No accessory muscle usage. No respiratory distress. She has no wheezes. She has no rales. She exhibits no tenderness.   Abdominal: Soft. Bowel sounds are normal. She exhibits distension and mass. There is no hepatosplenomegaly. There is tenderness. There is guarding. There is no rebound and no CVA tenderness. No hernia.   Liver is enlarged with severe tenderness but no rebound   Musculoskeletal: Normal range of motion. She exhibits no edema or tenderness.   Right upper arm graft does not look to be functioning well with multiple stenoses identified by physical examination but has a positive bruit and thrill   Neurological: She is alert and oriented to person, place, and time. She has normal reflexes. No cranial nerve deficit or sensory deficit. She exhibits normal muscle tone. Coordination normal.   Skin: Skin is warm and dry. No bruising, no ecchymosis and no rash noted. No cyanosis or erythema. No pallor. Nails show no clubbing.   Psychiatric: She  has a normal mood and affect. Her speech is normal and behavior is normal. Judgment and thought content normal.       Significant Labs:  All labs within the past 24 hours have been reviewed.    Significant Imaging:  Labs: Reviewed  X-Ray: Reviewed  EKG    Assessment/Plan:     ESRD (end stage renal disease)    We will do daily dialysis to achieve ultrafiltration.  I have placed urgent hemodialysis orders today.  Patient seems to be critically ill at this time.  Will give morphine for pain patient also seen on dialysis.    Patient Seen on HD ; HD is for ESRD ; HD orders written and reviewed with HD nurse and nursing staff ;   Access is functioning well ; UF Goal is 4 litres as tolerated ; Will Give Epogen for anemia ; F-180 dialyzer ;  DFR is 500 ; patient is tolerating HD well ; next HD will be scheduled based on daily rounding and patient's clinical situation     Parameters for Hypotension outlined in orders and communications with nurses                   Thank you for your consult.     Beth Santiago MD  Nephrology  [facility] [place]

## 2017-10-02 NOTE — ASSESSMENT & PLAN NOTE
We will do daily dialysis to achieve ultrafiltration.  I have placed urgent hemodialysis orders today.  Patient seems to be critically ill at this time.  Will give morphine for pain patient also seen on dialysis.    Patient Seen on HD ; HD is for ESRD ; HD orders written and reviewed with HD nurse and nursing staff ;   Access is functioning well ; UF Goal is 4 litres as tolerated ; Will Give Epogen for anemia ; F-180 dialyzer ;  DFR is 500 ; patient is tolerating HD well ; next HD will be scheduled based on daily rounding and patient's clinical situation     Parameters for Hypotension outlined in orders and communications with nurses

## 2017-10-02 NOTE — ED NOTES
Report to JESSICA Crespo RN at dialysis. She met me there for bedside handoff and to place patient on TELE cardiac monitor.

## 2017-10-03 VITALS
DIASTOLIC BLOOD PRESSURE: 86 MMHG | TEMPERATURE: 99 F | RESPIRATION RATE: 20 BRPM | SYSTOLIC BLOOD PRESSURE: 166 MMHG | WEIGHT: 138.38 LBS | HEIGHT: 66 IN | HEART RATE: 89 BPM | OXYGEN SATURATION: 93 % | BODY MASS INDEX: 22.24 KG/M2

## 2017-10-03 PROBLEM — I10 HYPERTENSION WITH FLUID OVERLOAD: Status: ACTIVE | Noted: 2017-10-03

## 2017-10-03 PROBLEM — D64.9 ANEMIA: Status: ACTIVE | Noted: 2017-10-03

## 2017-10-03 PROBLEM — E87.79 HYPERTENSION WITH FLUID OVERLOAD: Status: ACTIVE | Noted: 2017-10-03

## 2017-10-03 PROBLEM — Z21 HIV ANTIBODY POSITIVE: Status: ACTIVE | Noted: 2017-10-02

## 2017-10-03 PROBLEM — I10 HYPERTENSION WITH FLUID OVERLOAD: Status: RESOLVED | Noted: 2017-10-03 | Resolved: 2017-10-03

## 2017-10-03 PROBLEM — E87.79 HYPERTENSION WITH FLUID OVERLOAD: Status: RESOLVED | Noted: 2017-10-03 | Resolved: 2017-10-03

## 2017-10-03 PROBLEM — R56.9 SEIZURES: Status: ACTIVE | Noted: 2017-10-03

## 2017-10-03 LAB
ALBUMIN SERPL BCP-MCNC: 2.8 G/DL
ALP SERPL-CCNC: 158 U/L
ALT SERPL W/O P-5'-P-CCNC: 10 U/L
ANION GAP SERPL CALC-SCNC: 13 MMOL/L
AST SERPL-CCNC: 17 U/L
BASOPHILS # BLD AUTO: 0.01 K/UL
BASOPHILS NFR BLD: 0.1 %
BILIRUB SERPL-MCNC: 0.4 MG/DL
BUN SERPL-MCNC: 22 MG/DL
CALCIUM SERPL-MCNC: 9.9 MG/DL
CD3+CD4+ CELLS # BLD: 246 CELLS/UL (ref 300–1400)
CD3+CD4+ CELLS NFR BLD: 29.8 % (ref 28–57)
CHLORIDE SERPL-SCNC: 103 MMOL/L
CO2 SERPL-SCNC: 24 MMOL/L
CREAT SERPL-MCNC: 4.7 MG/DL
DIFFERENTIAL METHOD: ABNORMAL
EOSINOPHIL # BLD AUTO: 0.2 K/UL
EOSINOPHIL NFR BLD: 2.5 %
ERYTHROCYTE [DISTWIDTH] IN BLOOD BY AUTOMATED COUNT: 18.5 %
EST. GFR  (AFRICAN AMERICAN): 13 ML/MIN/1.73 M^2
EST. GFR  (NON AFRICAN AMERICAN): 11 ML/MIN/1.73 M^2
GLUCOSE SERPL-MCNC: 98 MG/DL
HCT VFR BLD AUTO: 32.1 %
HGB BLD-MCNC: 9.4 G/DL
LYMPHOCYTES # BLD AUTO: 1.5 K/UL
LYMPHOCYTES NFR BLD: 22.6 %
MCH RBC QN AUTO: 26.2 PG
MCHC RBC AUTO-ENTMCNC: 29.3 G/DL
MCV RBC AUTO: 89 FL
MONOCYTES # BLD AUTO: 0.8 K/UL
MONOCYTES NFR BLD: 12.3 %
NEUTROPHILS # BLD AUTO: 4.2 K/UL
NEUTROPHILS NFR BLD: 62.9 %
PLATELET # BLD AUTO: 177 K/UL
PMV BLD AUTO: 10.1 FL
POTASSIUM SERPL-SCNC: 4.5 MMOL/L
PROT SERPL-MCNC: 7.4 G/DL
RBC # BLD AUTO: 3.59 M/UL
SODIUM SERPL-SCNC: 140 MMOL/L
TROPONIN I SERPL DL<=0.01 NG/ML-MCNC: 0.03 NG/ML
TROPONIN I SERPL DL<=0.01 NG/ML-MCNC: 0.04 NG/ML
TROPONIN I SERPL DL<=0.01 NG/ML-MCNC: 0.05 NG/ML
WBC # BLD AUTO: 6.67 K/UL

## 2017-10-03 PROCEDURE — 25000003 PHARM REV CODE 250: Performed by: NURSE PRACTITIONER

## 2017-10-03 PROCEDURE — 84484 ASSAY OF TROPONIN QUANT: CPT

## 2017-10-03 PROCEDURE — 99232 SBSQ HOSP IP/OBS MODERATE 35: CPT | Mod: ,,, | Performed by: INTERNAL MEDICINE

## 2017-10-03 PROCEDURE — 25000003 PHARM REV CODE 250: Performed by: EMERGENCY MEDICINE

## 2017-10-03 PROCEDURE — 85025 COMPLETE CBC W/AUTO DIFF WBC: CPT

## 2017-10-03 PROCEDURE — 63600175 PHARM REV CODE 636 W HCPCS: Performed by: NURSE PRACTITIONER

## 2017-10-03 PROCEDURE — 63600175 PHARM REV CODE 636 W HCPCS: Performed by: EMERGENCY MEDICINE

## 2017-10-03 PROCEDURE — 84484 ASSAY OF TROPONIN QUANT: CPT | Mod: 91

## 2017-10-03 PROCEDURE — 63600175 PHARM REV CODE 636 W HCPCS: Performed by: INTERNAL MEDICINE

## 2017-10-03 PROCEDURE — 36415 COLL VENOUS BLD VENIPUNCTURE: CPT

## 2017-10-03 PROCEDURE — 80053 COMPREHEN METABOLIC PANEL: CPT

## 2017-10-03 PROCEDURE — 80100016 HC MAINTENANCE HEMODIALYSIS

## 2017-10-03 RX ORDER — ALPRAZOLAM 1 MG/1
1 TABLET ORAL 2 TIMES DAILY
Qty: 12 TABLET | Refills: 0 | Status: SHIPPED | OUTPATIENT
Start: 2017-10-03

## 2017-10-03 RX ORDER — HYDROCODONE BITARTRATE AND ACETAMINOPHEN 10; 325 MG/1; MG/1
1 TABLET ORAL EVERY 6 HOURS PRN
Qty: 12 TABLET | Refills: 0 | Status: SHIPPED | OUTPATIENT
Start: 2017-10-03

## 2017-10-03 RX ORDER — HEPARIN SODIUM 1000 [USP'U]/ML
2000 INJECTION, SOLUTION INTRAVENOUS; SUBCUTANEOUS ONCE
Status: COMPLETED | OUTPATIENT
Start: 2017-10-03 | End: 2017-10-03

## 2017-10-03 RX ORDER — SULFAMETHOXAZOLE AND TRIMETHOPRIM 400; 80 MG/1; MG/1
1 TABLET ORAL DAILY
Qty: 30 TABLET | Refills: 1 | Status: SHIPPED | OUTPATIENT
Start: 2017-10-03

## 2017-10-03 RX ADMIN — HYDROCODONE BITARTRATE AND ACETAMINOPHEN 1 TABLET: 10; 325 TABLET ORAL at 05:10

## 2017-10-03 RX ADMIN — LEVETIRACETAM 500 MG: 500 TABLET, FILM COATED ORAL at 09:10

## 2017-10-03 RX ADMIN — HEPARIN SODIUM 2000 UNITS: 1000 INJECTION, SOLUTION INTRAVENOUS; SUBCUTANEOUS at 01:10

## 2017-10-03 RX ADMIN — MORPHINE SULFATE 2 MG: 2 INJECTION, SOLUTION INTRAMUSCULAR; INTRAVENOUS at 01:10

## 2017-10-03 RX ADMIN — MORPHINE SULFATE 2 MG: 2 INJECTION, SOLUTION INTRAMUSCULAR; INTRAVENOUS at 04:10

## 2017-10-03 RX ADMIN — HYDROCODONE BITARTRATE AND ACETAMINOPHEN 1 TABLET: 10; 325 TABLET ORAL at 12:10

## 2017-10-03 RX ADMIN — CEFEPIME HYDROCHLORIDE 1 G: 1 INJECTION, SOLUTION INTRAVENOUS at 04:10

## 2017-10-03 RX ADMIN — VALSARTAN 320 MG: 80 TABLET, FILM COATED ORAL at 08:10

## 2017-10-03 RX ADMIN — PROMETHAZINE HYDROCHLORIDE 25 MG: 25 INJECTION INTRAMUSCULAR; INTRAVENOUS at 12:10

## 2017-10-03 RX ADMIN — ABACAVIR SULFATE 600 MG: 300 TABLET, FILM COATED ORAL at 08:10

## 2017-10-03 RX ADMIN — RALTEGRAVIR 400 MG: 400 TABLET, FILM COATED ORAL at 08:10

## 2017-10-03 RX ADMIN — HEPARIN SODIUM 5000 UNITS: 5000 INJECTION, SOLUTION INTRAVENOUS; SUBCUTANEOUS at 05:10

## 2017-10-03 RX ADMIN — TENOFOVIR DISOPROXIL FUMARATE 300 MG: 300 TABLET, COATED ORAL at 08:10

## 2017-10-03 RX ADMIN — LABETALOL HCL 300 MG: 100 TABLET, FILM COATED ORAL at 09:10

## 2017-10-03 RX ADMIN — ALPRAZOLAM 1 MG: 1 TABLET ORAL at 09:10

## 2017-10-03 RX ADMIN — MORPHINE SULFATE 2 MG: 2 INJECTION, SOLUTION INTRAMUSCULAR; INTRAVENOUS at 08:10

## 2017-10-03 RX ADMIN — AMLODIPINE BESYLATE 5 MG: 5 TABLET ORAL at 09:10

## 2017-10-03 RX ADMIN — HEPARIN SODIUM 5000 UNITS: 5000 INJECTION, SOLUTION INTRAVENOUS; SUBCUTANEOUS at 01:10

## 2017-10-03 RX ADMIN — PANTOPRAZOLE SODIUM 40 MG: 40 TABLET, DELAYED RELEASE ORAL at 09:10

## 2017-10-03 NOTE — HPI
Chief complaint  SOB        HPI   Chief complaint  SOB           HPI   Darcie Bryant is a 32 y.o. female patient with PMHx of seizure, HTN, and HIV who presents to the Emergency Department for SOB which onset gradually today. Per EMS, family called after pt had a seizure and fell out of the bed. Pt reports taking Keppra daily for seizures. Pt reports last being dialyzed 2 days ago and had 4.3L removed. Pt's nephrologist is Dr. Cho. Pt states she has dialysis on Tuesdays, Thursdays, and Saturdays. Symptoms are constant and moderate in severity. No mitigating or exacerbating factors reported. Associated sxs include R sided abd pain, HA, and abd distention. Patient denies any LOC, CP, extremity weakness/numbness, dizziness, cough, N/V, and all other sxs at this time. No further complaints or concerns at this time.      . Per EMS, family called after pt had a seizure and fell out of the bed. Pt reports taking Keppra daily for seizures. Pt reports last being dialyzed 2 days ago and had 4.3L removed. Pt's nephrologist is Dr. Cho. Pt states she has dialysis on Tuesdays, Thursdays, and Saturdays. Symptoms are constant and moderate in severity. No mitigating or exacerbating factors reported. Associated sxs include R sided abd pain, HA, and abd distention. Patient denies any LOC, CP, extremity weakness/numbness, dizziness, cough, N/V, and all other sxs at this time. No further complaints or concerns at this time.

## 2017-10-03 NOTE — PROGRESS NOTES
PT given discharge paperwork, discharge explained and teach back enforced, no distress noted, pt awaiting family to come pick her up.

## 2017-10-03 NOTE — CONSULTS
[facility]  Infectious Disease  Consult Note    Patient Name: Darcie Bryant  MRN: [id number]  Admission Date: 10/2/2017  Hospital Length of Stay: 1 days  Attending Physician: Jerri Aguirre MD  Primary Care Provider: Bobby Anand MD     Isolation Status: No active isolations    Patient information was obtained from patient, past medical records and ER records.      Consults  Assessment/Plan:     Seizures    Neurology consult , on keppra        ESRD (end stage renal disease)    Follow nephrology for HD        HIV (human immunodeficiency virus infection)    Will continue Isentress, Abacavir and Viread .  Send CD4 count .  She [facility] an appointment with [facility] on 10/10-encouraged to keep that appointment .            Thank you for your consult. I will follow-up with patient. Please contact us if you have any additional questions.    Barrera Berry MD  Infectious Disease  [facility]    Subjective:     Principal Problem: <principal problem not specified>    HPI:   32 year old female patient with history of  ESRD, seizures ,HIV since 2012-who [facility] . She is on  Isentress, Abacavir and Viread and reports more than 80% compliance with therapy. From the care everywhere records, she was seen at clinic on 04/2017.      On that clinic note, Lab data showed CD4 352 Date: 2-3-17  VL: undetected Date: 2-3-17  She was admitted at this time with seizures. There is associated history of dyspnea and cough. Chest x-ray showed  mild cardiomegaly with  right pleural effusion    Past Medical History:   Diagnosis Date    Anxiety     Cirrhosis     CKD (chronic kidney disease)     Dialysis patient     HIV (human immunodeficiency virus infection)     HTN (hypertension)     Seizures        History reviewed. No pertinent surgical history.    Review of patient's allergies indicates:   Allergen Reactions    Pcn [penicillins]        Medications:  Prescriptions Prior to  Admission   Medication Sig    abacavir (ZIAGEN) 300 mg Tab Take 600 mg by mouth once daily.    alprazolam (XANAX) 1 MG tablet Take 1 mg by mouth 2 (two) times daily.    cinacalcet (SENSIPAR) 30 MG Tab Take 30 mg by mouth daily with breakfast.    labetalol (NORMODYNE) 300 MG tablet Take 300 mg by mouth 2 (two) times daily.    levetiracetam (KEPPRA) 500 MG Tab Take 500 mg by mouth 2 (two) times daily.    pantoprazole (PROTONIX) 40 MG tablet Take 40 mg by mouth once daily.    raltegravir (ISENTRESS) 400 mg tablet Take 400 mg by mouth 2 (two) times daily.    tenofovir (VIREAD) 300 mg Tab Take 300 mg by mouth once daily.    trazodone (DESYREL) 100 MG tablet Take 100 mg by mouth every evening.    valsartan (DIOVAN) 320 MG tablet Take 320 mg by mouth once daily.     Antibiotics     Start     Stop Route Frequency Ordered    10/03/17 0500  cefepime in dextrose 5 % 1 gram/50 mL IVPB 1 g      -- IV Every 24 hours (non-standard times) 10/02/17 0634    10/02/17 1700  sulfamethoxazole-trimethoprim 400-80mg per tablet 1 tablet      -- Oral Daily 10/02/17 1051        Antifungals     None        Antivirals         Stop Route Frequency     abacavir      -- Oral Daily     raltegravir      -- Oral 2 times daily     tenofovir      -- Oral Daily             There is no immunization history on file for this patient.    Family History     None        Social History     Social History    Marital status: Single     Spouse name: N/A    Number of children: N/A    Years of education: N/A     Social History Main Topics    Smoking status: Current Some Day Smoker    Smokeless tobacco: Never Used    Alcohol use Yes    Drug use: No    Sexual activity: Not Asked     Other Topics Concern    None     Social History Narrative    None     Review of Systems   Constitutional: Negative for activity change, appetite change, chills, diaphoresis, fatigue, fever and unexpected weight change.   HENT: Negative for congestion, dental problem,  drooling, postnasal drip, rhinorrhea and voice change.    Eyes: Negative for discharge.   Respiratory: Positive for cough, shortness of breath and wheezing. Negative for apnea, choking, chest tightness and stridor.    Cardiovascular: Negative for chest pain, palpitations and leg swelling.   Gastrointestinal: Positive for abdominal distention and abdominal pain. Negative for blood in stool, constipation, diarrhea, nausea, rectal pain and vomiting.   Endocrine: Negative for cold intolerance, heat intolerance, polydipsia and polyuria.   Genitourinary: Negative for decreased urine volume, difficulty urinating, dysuria, enuresis, flank pain, frequency, hematuria and urgency.   Musculoskeletal: Negative for arthralgias, back pain, gait problem and joint swelling.   Skin: Negative for rash.   Allergic/Immunologic: Negative for food allergies and immunocompromised state.   Neurological: Negative for dizziness, tremors, syncope, numbness and headaches.   Hematological: Does not bruise/bleed easily.   Psychiatric/Behavioral: Negative for agitation, behavioral problems and self-injury. The patient is not nervous/anxious and is not hyperactive.    All other systems reviewed and are negative.    Objective:     Vital Signs (Most Recent):  Temp: 98.6 °F (37 °C) (10/03/17 0736)  Pulse: 83 (10/03/17 0736)  Resp: 18 (10/03/17 0736)  BP: (!) 166/101 (10/03/17 0736)  SpO2: 97 % (10/03/17 0736) Vital Signs (24h Range):  Temp:  [97.4 °F (36.3 °C)-98.9 °F (37.2 °C)] 98.6 °F (37 °C)  Pulse:  [69-92] 83  Resp:  [17-20] 18  SpO2:  [92 %-100 %] 97 %  BP: (149-229)/() 166/101     Weight: 62.8 kg (138 lb 6.4 oz)  Body mass index is 22.34 kg/m².    Estimated Creatinine Clearance: 15.9 mL/min (based on SCr of 4.7 mg/dL (H)).    Physical Exam   Constitutional: She is oriented to person, place, and time. She appears well-developed and well-nourished.   HENT:   Head: Normocephalic and atraumatic.   Eyes: Conjunctivae and EOM are normal. Pupils  are equal, round, and reactive to light.   Neck: Normal range of motion and full passive range of motion without pain. Neck supple. Carotid bruit is not present. No edema present. No thyroid mass and no thyromegaly present.   Cardiovascular: Normal rate, regular rhythm, S1 normal, S2 normal, intact distal pulses and normal pulses.   No extrasystoles are present. PMI is displaced.  Exam reveals no friction rub.    No murmur heard.  Positive JVD RV heave loud P2    Pulmonary/Chest: Effort normal. No accessory muscle usage. No respiratory distress. She has no wheezes. She has no rales. She exhibits no tenderness.   Abdominal: Soft. Bowel sounds are normal. She exhibits distension and mass. There is no hepatosplenomegaly. There is tenderness. There is guarding. There is no rebound and no CVA tenderness. No hernia.   Liver is enlarged with severe tenderness but no rebound   Musculoskeletal: Normal range of motion. She exhibits no edema or tenderness.   Right upper arm graft does not look to be functioning well with multiple stenoses identified by physical examination but has a positive bruit and thrill   Neurological: She is alert and oriented to person, place, and time. She has normal reflexes. No cranial nerve deficit or sensory deficit. She exhibits normal muscle tone. Coordination normal.   Skin: Skin is warm and dry. No bruising, no ecchymosis and no rash noted. No cyanosis or erythema. No pallor. Nails show no clubbing.   Psychiatric: She has a normal mood and affect. Her speech is normal and behavior is normal. Judgment and thought content normal.   Nursing note and vitals reviewed.      Significant Labs:   BMP:   Recent Labs  Lab 10/03/17  0549   GLU 98      K 4.5      CO2 24   BUN 22*   CREATININE 4.7*   CALCIUM 9.9     CBC:   Recent Labs  Lab 10/02/17  0222 10/02/17  0942 10/03/17  0549   WBC 6.38 6.96 6.67   HGB 11.5* 11.2* 9.4*   HCT 38.5 36.5* 32.1*    178 177     All pertinent labs within  the past 24 hours have been reviewed.    Significant Imaging: I have reviewed all pertinent imaging results/findings within the past 24 hours.

## 2017-10-03 NOTE — HPI
32 year old female patient with history of  ESRD, seizures ,HIV since 2012-who [facility] . She is on  Isentress, Abacavir and Viread and reports more than 80% compliance with therapy. From the care everywhere records, she was seen at clinic on 04/2017.      On that clinic note, Lab data showed CD4 352 Date: 2-3-17  VL: undetected Date: 2-3-17  She was admitted at this time with seizures. There is associated history of dyspnea and cough. Chest x-ray showed  mild cardiomegaly with  right pleural effusion

## 2017-10-03 NOTE — HOSPITAL COURSE
Darcie Bryant is a 32 y.o. female patient with PMHx of seizure, HTN, HIV, ESRD on HD, non compliant with meds and diet who presented to the ER for SOB which onset gradually today. Per EMS, family called after pt had a seizure and fell out of the bed. Pt reports taking Keppra daily for seizures. Pt reports last being dialyzed 2 days ago and had 4.3L removed. Pt's nephrologist is Dr. Cho. Pt states she has dialysis on TTS. Associated sxs include R sided abd pain, HA, and abd distention.         Subsequently she was found to have severe uncontrolled HTN and fluid overload-- most likely from non compliance with HD and salt and fluid restriction. She was admitted and nephrology consulted who proceeded with urgent HD and about 4.5 L of Fluid removed. Her initial BP were also quiet high and her BP meds were resumed which brought the BP under control. She had also c/o increased R sided abd pain and was worried about ascites, however US abd showed only a small ascites and B small Pleural effusions-- again likely fluid overload from incomplete Dialysis. She reportedly was very casual about HD at the dialysis unit and would arrive there late and hence get HD only for a shorter time and would would argue and not cooperate with the Dialysis staff. Her KUB showed obvious constipation-- hence she was given Dulcolax and Mag Citrate-- again she only took the Dulcolax and had a large BM last night and her Abd pain and BP have improved a lot since. She is getting a repeat HD today to help bring her to her dry weight after which she can be discharged home as per Renal. She also had no recurrence of seizures since admit. She was also counseled about compliance with diet, salt and fluid restriction as well as get HD done properly and on time. She understands and accepts. She was also seen by Jamal Wilson about her HIV disease who informed that her last CD4 count in Feb 2017 was 352 and she is followed by the [facility] clinic. She was seen and  examined today, all her Sx have resolved and she was deemed stable to be discharged home today.

## 2017-10-03 NOTE — ASSESSMENT & PLAN NOTE
Will continue Isentress, Abacavir and Viread .  Send CD4 count .  She has an appointment with the [facility] clinic ID on 10/10-encouraged to keep that appointment .

## 2017-10-03 NOTE — DISCHARGE SUMMARY
[facility] Hospital Medicine  Discharge Summary      Patient Name: Darcie Bryant  MRN: [id number]  Admission Date: 10/2/2017  Hospital Length of Stay: 1 days  Discharge Date and Time:  10/03/2017 2:38 PM  Attending Physician: Jerri Aguirre MD   Discharging Provider: Jerri Aguirre MD  Primary Care Provider: Bobby Anand MD      HPI:   Chief complaint  SOB        HPI   Chief complaint  SOB           HPI   Darcie Bryant is a 32 y.o. female patient with PMHx of seizure, HTN, and HIV who presents to the Emergency Department for SOB which onset gradually today. Per EMS, family called after pt had a seizure and fell out of the bed. Pt reports taking Keppra daily for seizures. Pt reports last being dialyzed 2 days ago and had 4.3L removed. Pt's nephrologist is Dr. Cho. Pt states she has dialysis on Tuesdays, Thursdays, and Saturdays. Symptoms are constant and moderate in severity. No mitigating or exacerbating factors reported. Associated sxs include R sided abd pain, HA, and abd distention. Patient denies any LOC, CP, extremity weakness/numbness, dizziness, cough, N/V, and all other sxs at this time. No further complaints or concerns at this time.      . Per EMS, family called after pt had a seizure and fell out of the bed. Pt reports taking Keppra daily for seizures. Pt reports last being dialyzed 2 days ago and had 4.3L removed. Pt's nephrologist is Dr. Cho. Pt states she has dialysis on Tuesdays, Thursdays, and Saturdays. Symptoms are constant and moderate in severity. No mitigating or exacerbating factors reported. Associated sxs include R sided abd pain, HA, and abd distention. Patient denies any LOC, CP, extremity weakness/numbness, dizziness, cough, N/V, and all other sxs at this time. No further complaints or concerns at this time.          * No surgery found *      Indwelling Lines/Drains at time of discharge:   Lines/Drains/Airways     Drain                 Hemodialysis AV Graft Right  upper arm -- days              Hospital Course:   Darcie Bryant is a 32 y.o. female patient with PMHx of seizure, HTN, HIV, ESRD on HD, non compliant with meds and diet who presented to the ER for SOB which onset gradually today. Per EMS, family called after pt had a seizure and fell out of the bed. Pt reports taking Keppra daily for seizures. Pt reports last being dialyzed 2 days ago and had 4.3L removed. Pt's nephrologist is Dr. Cho. Pt states she has dialysis on TTS. Associated sxs include R sided abd pain, HA, and abd distention.         Subsequently she was found to have severe uncontrolled HTN and fluid overload-- most likely from non compliance with HD and salt and fluid restriction. She was admitted and nephrology consulted who proceeded with urgent HD and about 4.5 L of Fluid removed. Her initial BP were also quiet high and her BP meds were resumed which brought the BP under control. She had also c/o increased R sided abd pain and was worried about ascites, however US abd showed only a small ascites and B small Pleural effusions-- again likely fluid overload from incomplete Dialysis. She reportedly was very casual about HD at the dialysis unit and would arrive there late and hence get HD only for a shorter time and would would argue and not cooperate with the Dialysis staff. Her KUB showed obvious constipation-- hence she was given Dulcolax and Mag Citrate-- again she only took the Dulcolax and had a large BM last night and her Abd pain and BP have improved a lot since. She is getting a repeat HD today to help bring her to her dry weight after which she can be discharged home as per Renal. She also had no recurrence of seizures since admit. She was also counseled about compliance with diet, salt and fluid restriction as well as get HD done properly and on time. She understands and accepts. She was also seen by Jamal Wilson about her HIV disease who informed that her last CD4 count in Feb 2017 was 352 and  [facility]. She was seen and examined today, all her Sx have resolved and she was deemed stable to be discharged home today.            Consults:   Consults         Status Ordering Provider     Inpatient consult to Infectious Diseases  Once     Provider:  Barrera Berry MD    Acknowledged GIORGI PENDLETON     Inpatient consult to Nephrology  Once     Provider:  Beth Santiago MD    Acknowledged GIORGI PENDLETON          Significant Diagnostic Studies: Labs:   BMP:   Recent Labs  Lab 10/02/17  0222 10/03/17  0549   GLU 77 98    140   K 5.0 4.5    103   CO2 23 24   BUN 25* 22*   CREATININE 5.6* 4.7*   CALCIUM 9.9 9.9   , CMP   Recent Labs  Lab 10/02/17  0222 10/03/17  0549    140   K 5.0 4.5    103   CO2 23 24   GLU 77 98   BUN 25* 22*   CREATININE 5.6* 4.7*   CALCIUM 9.9 9.9   PROT 8.3 7.4   ALBUMIN 3.2* 2.8*   BILITOT 0.6 0.4   ALKPHOS 218* 158*   AST 35 17   ALT 15 10   ANIONGAP 15 13   ESTGFRAFRICA 11* 13*   EGFRNONAA 9* 11*   , CBC   Recent Labs  Lab 10/02/17  0222 10/02/17  0942 10/03/17  0549   WBC 6.38 6.96 6.67   HGB 11.5* 11.2* 9.4*   HCT 38.5 36.5* 32.1*    178 177   , Troponin   Recent Labs  Lab 10/03/17  1141   TROPONINI 0.032*    and All labs within the past 24 hours have been reviewed    Pending Diagnostic Studies:     Procedure Component Value Units Date/Time    CD4 T-[place] Cells [[id number]] Collected:  10/02/17 0500    Order Status:  Sent Lab Status:  In process Updated:  10/03/17 0756    Specimen:  Blood from Blood         Final Active Diagnoses:    Diagnosis Date Noted POA    ESRD (end stage renal disease) [N18.6] 10/02/2017 Yes    Seizures [R56.9] 10/03/2017 Yes    Anemia [D64.9] 10/03/2017 Yes    HIV (human immunodeficiency virus infection) [B20] 10/02/2017 Yes      Problems Resolved During this Admission:    Diagnosis Date Noted Date Resolved POA    PRINCIPAL PROBLEM:  Hypertension with fluid overload [I10, E87.79] 10/03/2017 10/03/2017 Yes       No new Assessment & Plan notes have been filed under this hospital service since the last note was generated.  Service: Hospital Medicine      Discharged Condition: stable    Disposition: Home or Self Care    Follow Up:  Follow-up Information     Bobby Anand MD. Schedule an appointment as soon as possible for a visit in 3 days.    Specialty:  Internal Medicine  Why:  Hospital follow up  Contact information:  [id number] [id number]  [phone]                 Patient Instructions:     Diet renal     Diet Cardiac     Activity as tolerated       Medications:  Reconciled Home Medications:   Current Discharge Medication List      START taking these medications    Details   hydrocodone-acetaminophen 10-325mg (NORCO)  mg Tab Take 1 tablet by mouth every 6 (six) hours as needed.  Qty: 12 tablet, Refills: 0      sulfamethoxazole-trimethoprim 400-80mg (BACTRIM,SEPTRA) 400-80 mg per tablet Take 1 tablet by mouth once daily.  Qty: 30 tablet, Refills: 1         CONTINUE these medications which have CHANGED    Details   alprazolam (XANAX) 1 MG tablet Take 1 tablet (1 mg total) by mouth 2 (two) times daily.  Qty: 12 tablet, Refills: 0         CONTINUE these medications which have NOT CHANGED    Details   abacavir (ZIAGEN) 300 mg Tab Take 600 mg by mouth once daily.      cinacalcet (SENSIPAR) 30 MG Tab Take 30 mg by mouth daily with breakfast.      labetalol (NORMODYNE) 300 MG tablet Take 300 mg by mouth 2 (two) times daily.      levetiracetam (KEPPRA) 500 MG Tab Take 500 mg by mouth 2 (two) times daily.      pantoprazole (PROTONIX) 40 MG tablet Take 40 mg by mouth once daily.      raltegravir (ISENTRESS) 400 mg tablet Take 400 mg by mouth 2 (two) times daily.      tenofovir (VIREAD) 300 mg Tab Take 300 mg by mouth once daily.      trazodone (DESYREL) 100 MG tablet Take 100 mg by mouth every evening.      valsartan (DIOVAN) 320 MG tablet Take 320 mg by mouth once daily.           Time spent on the  discharge of patient: 47 minutes      Jerri Aguirre MD  Department of Hospital Medicine  [facility]

## 2017-10-03 NOTE — SUBJECTIVE & OBJECTIVE
Past Medical History:   Diagnosis Date    Anxiety     Cirrhosis     CKD (chronic kidney disease)     Dialysis patient     HIV (human immunodeficiency virus infection)     HTN (hypertension)     Seizures        History reviewed. No pertinent surgical history.    Review of patient's allergies indicates:   Allergen Reactions    Pcn [penicillins]        Medications:  Prescriptions Prior to Admission   Medication Sig    abacavir (ZIAGEN) 300 mg Tab Take 600 mg by mouth once daily.    alprazolam (XANAX) 1 MG tablet Take 1 mg by mouth 2 (two) times daily.    cinacalcet (SENSIPAR) 30 MG Tab Take 30 mg by mouth daily with breakfast.    labetalol (NORMODYNE) 300 MG tablet Take 300 mg by mouth 2 (two) times daily.    levetiracetam (KEPPRA) 500 MG Tab Take 500 mg by mouth 2 (two) times daily.    pantoprazole (PROTONIX) 40 MG tablet Take 40 mg by mouth once daily.    raltegravir (ISENTRESS) 400 mg tablet Take 400 mg by mouth 2 (two) times daily.    tenofovir (VIREAD) 300 mg Tab Take 300 mg by mouth once daily.    trazodone (DESYREL) 100 MG tablet Take 100 mg by mouth every evening.    valsartan (DIOVAN) 320 MG tablet Take 320 mg by mouth once daily.     Antibiotics     Start     Stop Route Frequency Ordered    10/03/17 0500  cefepime in dextrose 5 % 1 gram/50 mL IVPB 1 g      -- IV Every 24 hours (non-standard times) 10/02/17 0634    10/02/17 1700  sulfamethoxazole-trimethoprim 400-80mg per tablet 1 tablet      -- Oral Daily 10/02/17 1051        Antifungals     None        Antivirals         Stop Route Frequency     abacavir      -- Oral Daily     raltegravir      -- Oral 2 times daily     tenofovir      -- Oral Daily             There is no immunization history on file for this patient.    Family History     None        Social History     Social History    Marital status: Single     Spouse name: N/A    Number of children: N/A    Years of education: N/A     Social History Main Topics    Smoking status:  Current Some Day Smoker    Smokeless tobacco: Never Used    Alcohol use Yes    Drug use: No    Sexual activity: Not Asked     Other Topics Concern    None     Social History Narrative    None     Review of Systems   Constitutional: Negative for activity change, appetite change, chills, diaphoresis, fatigue, fever and unexpected weight change.   HENT: Negative for congestion, dental problem, drooling, postnasal drip, rhinorrhea and voice change.    Eyes: Negative for discharge.   Respiratory: Positive for cough, shortness of breath and wheezing. Negative for apnea, choking, chest tightness and stridor.    Cardiovascular: Negative for chest pain, palpitations and leg swelling.   Gastrointestinal: Positive for abdominal distention and abdominal pain. Negative for blood in stool, constipation, diarrhea, nausea, rectal pain and vomiting.   Endocrine: Negative for cold intolerance, heat intolerance, polydipsia and polyuria.   Genitourinary: Negative for decreased urine volume, difficulty urinating, dysuria, enuresis, flank pain, frequency, hematuria and urgency.   Musculoskeletal: Negative for arthralgias, back pain, gait problem and joint swelling.   Skin: Negative for rash.   Allergic/Immunologic: Negative for food allergies and immunocompromised state.   Neurological: Negative for dizziness, tremors, syncope, numbness and headaches.   Hematological: Does not bruise/bleed easily.   Psychiatric/Behavioral: Negative for agitation, behavioral problems and self-injury. The patient is not nervous/anxious and is not hyperactive.    All other systems reviewed and are negative.    Objective:     Vital Signs (Most Recent):  Temp: 98.6 °F (37 °C) (10/03/17 0736)  Pulse: 83 (10/03/17 0736)  Resp: 18 (10/03/17 0736)  BP: (!) 166/101 (10/03/17 0736)  SpO2: 97 % (10/03/17 0736) Vital Signs (24h Range):  Temp:  [97.4 °F (36.3 °C)-98.9 °F (37.2 °C)] 98.6 °F (37 °C)  Pulse:  [69-92] 83  Resp:  [17-20] 18  SpO2:  [92 %-100 %] 97  %  BP: (149-229)/() 166/101     Weight: 62.8 kg (138 lb 6.4 oz)  Body mass index is 22.34 kg/m².    Estimated Creatinine Clearance: 15.9 mL/min (based on SCr of 4.7 mg/dL (H)).    Physical Exam   Constitutional: She is oriented to person, place, and time. She appears well-developed and well-nourished.   HENT:   Head: Normocephalic and atraumatic.   Eyes: Conjunctivae and EOM are normal. Pupils are equal, round, and reactive to light.   Neck: Normal range of motion and full passive range of motion without pain. Neck supple. Carotid bruit is not present. No edema present. No thyroid mass and no thyromegaly present.   Cardiovascular: Normal rate, regular rhythm, S1 normal, S2 normal, intact distal pulses and normal pulses.   No extrasystoles are present. PMI is displaced.  Exam reveals no friction rub.    No murmur heard.  Positive JVD RV heave loud P2    Pulmonary/Chest: Effort normal. No accessory muscle usage. No respiratory distress. She has no wheezes. She has no rales. She exhibits no tenderness.   Abdominal: Soft. Bowel sounds are normal. She exhibits distension and mass. There is no hepatosplenomegaly. There is tenderness. There is guarding. There is no rebound and no CVA tenderness. No hernia.   Liver is enlarged with severe tenderness but no rebound   Musculoskeletal: Normal range of motion. She exhibits no edema or tenderness.   Right upper arm graft does not look to be functioning well with multiple stenoses identified by physical examination but has a positive bruit and thrill   Neurological: She is alert and oriented to person, place, and time. She has normal reflexes. No cranial nerve deficit or sensory deficit. She exhibits normal muscle tone. Coordination normal.   Skin: Skin is warm and dry. No bruising, no ecchymosis and no rash noted. No cyanosis or erythema. No pallor. Nails show no clubbing.   Psychiatric: She has a normal mood and affect. Her speech is normal and behavior is normal.  Judgment and thought content normal.   Nursing note and vitals reviewed.      Significant Labs:   BMP:   Recent Labs  Lab 10/03/17  0549   GLU 98      K 4.5      CO2 24   BUN 22*   CREATININE 4.7*   CALCIUM 9.9     CBC:   Recent Labs  Lab 10/02/17  0222 10/02/17  0942 10/03/17  0549   WBC 6.38 6.96 6.67   HGB 11.5* 11.2* 9.4*   HCT 38.5 36.5* 32.1*    178 177     All pertinent labs within the past 24 hours have been reviewed.    Significant Imaging: I have reviewed all pertinent imaging results/findings within the past 24 hours.

## 2017-10-03 NOTE — PROGRESS NOTES
[facility] -   Nephrology  Progress Note    Patient Name: Darcie Bryant  MRN: [id number]  Admission Date: 10/2/2017  Hospital Length of Stay: 1 days  Attending Provider: Jerri Aguirre MD   Primary Care Physician: Bobby Anand MD  Principal Problem:<principal problem not specified>    Subjective:     HPI: Patient is a 32-year-old female with history of HIV since 2006.  Has noted from [place].  Also has ESRD on hemodialysis on a Tuesday Thursday and [facility] care of Dr. Cho.  Patient's last dialysis was Saturday where she went and received for dialysis and had an ultrafiltration of 4.5 kg.  Patient has a right upper arm graft which was placed in [place] but has not been doing so well on that graft with recurrent bleeding and has been evaluated by Dr. Bosch.    Today patient being admitted with severe shortness of breath, PND orthopnea and CHF    10/03/2017  Patient feels better.  Complains of abdominal pain, neck pain and shoulder pain    Past Medical History:   Diagnosis Date    Anxiety     Cirrhosis     CKD (chronic kidney disease)     Dialysis patient     HIV (human immunodeficiency virus infection)     HTN (hypertension)     Seizures        History reviewed. No pertinent surgical history.    Review of patient's allergies indicates:   Allergen Reactions    Pcn [penicillins]      Current Facility-Administered Medications   Medication Frequency    abacavir tablet 600 mg Daily    acetaminophen tablet 650 mg Q6H PRN    alprazolam tablet 1 mg BID    amlodipine tablet 5 mg Daily    cefepime in dextrose 5 % 1 gram/50 mL IVPB 1 g Q24H    heparin (porcine) injection 2,000 Units Once    heparin (porcine) injection 5,000 Units Q8H    hydrALAZINE injection 10 mg Q6H PRN    hydrocodone-acetaminophen 10-325mg per tablet 1 tablet Q6H PRN    labetalol tablet 300 mg BID    levetiracetam tablet 500 mg BID    morphine injection 2 mg Q4H PRN    ondansetron  injection 4 mg Q8H PRN    pantoprazole EC tablet 40 mg Daily    promethazine (PHENERGAN) 25 mg in dextrose 5 % 50 mL IVPB Q6H PRN    raltegravir tablet 400 mg BID    sulfamethoxazole-trimethoprim 400-80mg per tablet 1 tablet Daily    tenofovir tablet 300 mg Daily    trazodone tablet 100 mg QHS    valsartan tablet 320 mg Daily     Family History     None        Social History Main Topics    Smoking status: Current Some Day Smoker    Smokeless tobacco: Never Used    Alcohol use Yes    Drug use: No    Sexual activity: Not on file     Review of Systems   Constitutional: Negative for activity change, appetite change, chills, diaphoresis, fatigue, fever and unexpected weight change.   HENT: Negative for congestion, dental problem, drooling, postnasal drip, rhinorrhea and voice change.    Eyes: Negative for discharge.   Respiratory: Positive for cough, shortness of breath and wheezing. Negative for apnea, choking, chest tightness and stridor.    Cardiovascular: Negative for chest pain, palpitations and leg swelling.   Gastrointestinal: Positive for abdominal distention and abdominal pain. Negative for blood in stool, constipation, diarrhea, nausea, rectal pain and vomiting.   Endocrine: Negative for cold intolerance, heat intolerance, polydipsia and polyuria.   Genitourinary: Negative for decreased urine volume, difficulty urinating, dysuria, enuresis, flank pain, frequency, hematuria and urgency.   Musculoskeletal: Negative for arthralgias, back pain, gait problem and joint swelling.   Skin: Negative for rash.   Allergic/Immunologic: Negative for food allergies and immunocompromised state.   Neurological: Negative for dizziness, tremors, syncope, numbness and headaches.   Hematological: Does not bruise/bleed easily.   Psychiatric/Behavioral: Negative for agitation, behavioral problems and self-injury. The patient is not nervous/anxious and is not hyperactive.    All other systems reviewed and are  negative.    Objective:     Vital Signs (Most Recent):  Temp: 98.6 °F (37 °C) (10/03/17 0736)  Pulse: 83 (10/03/17 0736)  Resp: 18 (10/03/17 0736)  BP: (!) 166/101 (10/03/17 0736)  SpO2: 97 % (10/03/17 0736)  O2 Device (Oxygen Therapy): room air (10/03/17 0736) Vital Signs (24h Range):  Temp:  [97.4 °F (36.3 °C)-98.9 °F (37.2 °C)] 98.6 °F (37 °C)  Pulse:  [69-92] 83  Resp:  [17-20] 18  SpO2:  [92 %-100 %] 97 %  BP: (149-229)/() 166/101     Weight: 62.8 kg (138 lb 6.4 oz) (10/03/17 0300)  Body mass index is 22.34 kg/m².  Body surface area is 1.71 meters squared.    I/O last 3 completed shifts:  In: 580 [P.O.:480; IV Piggyback:100]  Out: 4500 [Other:4500]    Physical Exam   Constitutional: She is oriented to person, place, and time. She appears well-developed and well-nourished.   HENT:   Head: Normocephalic and atraumatic.   Eyes: Conjunctivae and EOM are normal. Pupils are equal, round, and reactive to light.   Neck: Normal range of motion and full passive range of motion without pain. Neck supple. Carotid bruit is not present. No edema present. No thyroid mass and no thyromegaly present.   Cardiovascular: Normal rate, regular rhythm, S1 normal, S2 normal, intact distal pulses and normal pulses.   No extrasystoles are present. PMI is displaced.  Exam reveals no friction rub.    No murmur heard.  Positive JVD RV heave loud P2    Pulmonary/Chest: Effort normal. No accessory muscle usage. No respiratory distress. She has no wheezes. She has no rales. She exhibits no tenderness.   Abdominal: Soft. Bowel sounds are normal. She exhibits distension and mass. There is no hepatosplenomegaly. There is tenderness. There is guarding. There is no rebound and no CVA tenderness. No hernia.   Liver is enlarged with severe tenderness but no rebound positive ascites   Musculoskeletal: Normal range of motion. She exhibits no edema or tenderness.   Right upper arm graft does not look to be functioning well with multiple stenoses  identified by physical examination but has a positive bruit and thrill   Neurological: She is alert and oriented to person, place, and time. She has normal reflexes. No cranial nerve deficit or sensory deficit. She exhibits normal muscle tone. Coordination normal.   Skin: Skin is warm and dry. No bruising, no ecchymosis and no rash noted. No cyanosis or erythema. No pallor. Nails show no clubbing.   Psychiatric: She has a normal mood and affect. Her speech is normal and behavior is normal. Judgment and thought content normal.       Significant Labs:  All labs within the past 24 hours have been reviewed.    Significant Imaging:  Labs: Reviewed  X-Ray: Reviewed  EKG    Assessment/Plan:     ESRD (end stage renal disease)    Resume routine hemodialysis today.  Patient feels much better after urgent hemodialysis yesterday.  Patient should be able to go home after dialysis today.            Thank you for your consult.     Beth Santiago MD  Nephrology  [facility] -

## 2017-10-03 NOTE — SIGNIFICANT EVENT
I have called the charge nurse in the dialysis unit on [facility] on [address].  Apparently patient is very noncompliant.  Has a drug-seeking behavior area and she misses dialysis and also cuts her dialysis time.  She also was offered extra treatment for her fluid overload which she refused and got really mad at the dialysis unit staff.

## 2017-10-03 NOTE — SUBJECTIVE & OBJECTIVE
Past Medical History:   Diagnosis Date    Anxiety     Cirrhosis     CKD (chronic kidney disease)     Dialysis patient     HIV (human immunodeficiency virus infection)     HTN (hypertension)     Seizures        History reviewed. No pertinent surgical history.    Review of patient's allergies indicates:   Allergen Reactions    Pcn [penicillins]      Current Facility-Administered Medications   Medication Frequency    abacavir tablet 600 mg Daily    acetaminophen tablet 650 mg Q6H PRN    alprazolam tablet 1 mg BID    amlodipine tablet 5 mg Daily    cefepime in dextrose 5 % 1 gram/50 mL IVPB 1 g Q24H    heparin (porcine) injection 2,000 Units Once    heparin (porcine) injection 5,000 Units Q8H    hydrALAZINE injection 10 mg Q6H PRN    hydrocodone-acetaminophen 10-325mg per tablet 1 tablet Q6H PRN    labetalol tablet 300 mg BID    levetiracetam tablet 500 mg BID    morphine injection 2 mg Q4H PRN    ondansetron injection 4 mg Q8H PRN    pantoprazole EC tablet 40 mg Daily    promethazine (PHENERGAN) 25 mg in dextrose 5 % 50 mL IVPB Q6H PRN    raltegravir tablet 400 mg BID    sulfamethoxazole-trimethoprim 400-80mg per tablet 1 tablet Daily    tenofovir tablet 300 mg Daily    trazodone tablet 100 mg QHS    valsartan tablet 320 mg Daily     Family History     None        Social History Main Topics    Smoking status: Current Some Day Smoker    Smokeless tobacco: Never Used    Alcohol use Yes    Drug use: No    Sexual activity: Not on file     Review of Systems   Constitutional: Negative for activity change, appetite change, chills, diaphoresis, fatigue, fever and unexpected weight change.   HENT: Negative for congestion, dental problem, drooling, postnasal drip, rhinorrhea and voice change.    Eyes: Negative for discharge.   Respiratory: Positive for cough, shortness of breath and wheezing. Negative for apnea, choking, chest tightness and stridor.    Cardiovascular: Negative for chest pain,  palpitations and leg swelling.   Gastrointestinal: Positive for abdominal distention and abdominal pain. Negative for blood in stool, constipation, diarrhea, nausea, rectal pain and vomiting.   Endocrine: Negative for cold intolerance, heat intolerance, polydipsia and polyuria.   Genitourinary: Negative for decreased urine volume, difficulty urinating, dysuria, enuresis, flank pain, frequency, hematuria and urgency.   Musculoskeletal: Negative for arthralgias, back pain, gait problem and joint swelling.   Skin: Negative for rash.   Allergic/Immunologic: Negative for food allergies and immunocompromised state.   Neurological: Negative for dizziness, tremors, syncope, numbness and headaches.   Hematological: Does not bruise/bleed easily.   Psychiatric/Behavioral: Negative for agitation, behavioral problems and self-injury. The patient is not nervous/anxious and is not hyperactive.    All other systems reviewed and are negative.    Objective:     Vital Signs (Most Recent):  Temp: 98.6 °F (37 °C) (10/03/17 0736)  Pulse: 83 (10/03/17 0736)  Resp: 18 (10/03/17 0736)  BP: (!) 166/101 (10/03/17 0736)  SpO2: 97 % (10/03/17 0736)  O2 Device (Oxygen Therapy): room air (10/03/17 0736) Vital Signs (24h Range):  Temp:  [97.4 °F (36.3 °C)-98.9 °F (37.2 °C)] 98.6 °F (37 °C)  Pulse:  [69-92] 83  Resp:  [17-20] 18  SpO2:  [92 %-100 %] 97 %  BP: (149-229)/() 166/101     Weight: 62.8 kg (138 lb 6.4 oz) (10/03/17 0300)  Body mass index is 22.34 kg/m².  Body surface area is 1.71 meters squared.    I/O last 3 completed shifts:  In: 580 [P.O.:480; IV Piggyback:100]  Out: 4500 [Other:4500]    Physical Exam   Constitutional: She is oriented to person, place, and time. She appears well-developed and well-nourished.   HENT:   Head: Normocephalic and atraumatic.   Eyes: Conjunctivae and EOM are normal. Pupils are equal, round, and reactive to light.   Neck: Normal range of motion and full passive range of motion without pain. Neck supple.  Carotid bruit is not present. No edema present. No thyroid mass and no thyromegaly present.   Cardiovascular: Normal rate, regular rhythm, S1 normal, S2 normal, intact distal pulses and normal pulses.   No extrasystoles are present. PMI is displaced.  Exam reveals no friction rub.    No murmur heard.  Positive JVD RV heave loud P2    Pulmonary/Chest: Effort normal. No accessory muscle usage. No respiratory distress. She has no wheezes. She has no rales. She exhibits no tenderness.   Abdominal: Soft. Bowel sounds are normal. She exhibits distension and mass. There is no hepatosplenomegaly. There is tenderness. There is guarding. There is no rebound and no CVA tenderness. No hernia.   Liver is enlarged with severe tenderness but no rebound positive ascites   Musculoskeletal: Normal range of motion. She exhibits no edema or tenderness.   Right upper arm graft does not look to be functioning well with multiple stenoses identified by physical examination but has a positive bruit and thrill   Neurological: She is alert and oriented to person, place, and time. She has normal reflexes. No cranial nerve deficit or sensory deficit. She exhibits normal muscle tone. Coordination normal.   Skin: Skin is warm and dry. No bruising, no ecchymosis and no rash noted. No cyanosis or erythema. No pallor. Nails show no clubbing.   Psychiatric: She has a normal mood and affect. Her speech is normal and behavior is normal. Judgment and thought content normal.       Significant Labs:  All labs within the past 24 hours have been reviewed.    Significant Imaging:  Labs: Reviewed  X-Ray: Reviewed  EKG

## 2017-10-03 NOTE — PLAN OF CARE
Problem: Patient Care Overview  Goal: Plan of Care Review  Outcome: Ongoing (interventions implemented as appropriate)  Pt AAOx4. POC discussed w/patient, verbalized understanding. NSR on monitor. VSS. Voids per BRP. Patient turns independently in bed. Skin: Scar noted on abdomen. Fall precautions in place, bed alarm on, bed in lowest, call light and personal items within reach.

## 2017-10-03 NOTE — PROGRESS NOTES
Pt seen nad examined during HD in the dialysis unit-- admitted after a seizure with acute CHF exacerbation with SOB/FLORENTINO. Orthopnea-- BNP 4900. started on urgent HD by Nephrology which she is tolerating well. She also c/o Abd pain and she appears constipated, may also have some Ascites and and pleural effluxson sec to CRF with CHF. Will check KUB and US abd.  VSS afeb, PE unchanged from this am

## 2017-10-03 NOTE — ASSESSMENT & PLAN NOTE
Resume routine hemodialysis today.  Patient feels much better after urgent hemodialysis yesterday.  Patient should be able to go home after dialysis today.

## 2017-10-05 NOTE — PHYSICIAN QUERY
PT Name: Darcie Bryant  MR #: [id number]    Physician Query Form - HIV Clarification     CDS/: Effie Luis RN  CCDS        Contact information:   [phone]    This form is a permanent document in the medical record.     Query Date: October 5, 2017      By submitting this query, we are merely seeking further clarification of documentation. Please utilize your independent clinical judgment when addressing the question(s) below.    The Medical record contains the following:   Indicators   Supporting Clinical Findings Location in Medical Record   x HIV or AIDS Hx of HIV infection H&P   x CD4 Count          CD4%        Viral Load Absolute CD 4  CD4% 29.8 Lab 10/2    History of Opportunistic Infection      Cancer  Pneumocystis Pneumonia (PCP)  Cytomegalovirus (CMV)  Kaposi Sarcoma      HIV-Related Conditions (e.g. Dementia, Encephalopathy) documented     x Medications HIV (human immunodeficiency virus infection)     Will continue Isentress, Abacavir and Viread .      Infectious disease consult note    Other       Please clarify the patients HIV status  Per CDC publication Vol 60 RR-17 https://www.cdc.gov/mmwr/preview/mmwrhtml/vc7820c3.htmRelating to the classification HIV Infection, once a patient is diagnosed with AIDS the diagnosis still stands even if, after treatment, the CD4+ T cell count rises to above 200 per ìL of blood or other AIDS-defining illnesses are cured.       The following are AIDS-Defining Illnesses or HIV-Related Diseases.  Bacterial infections, multiple or recurrent only in children aged <6 years Kaposi sarcoma   Candidiasis of bronchi, trachea, or lungs Lymphoma, Burkitt (or equivalent term)   Candidiasis of esophagus Lymphoma, immunoblastic (or equivalent term)   Cervical cancer, invasive Only among adults, adolescents, and children aged > 6 years. Lymphoma, primary, of brain   Coccidioidomycosis, disseminated or extrapulmonary Mycobacterium avium complex or Mycobacterium  kansasii, disseminated or extrapulmonary   Cryptococcosis, extrapulmonary Mycobacterium tuberculosis of any site, pulmonary, disseminated, or extrapulmonary   Cryptosporidiosis, chronic intestinal (>1 months duration) Mycobacterium, other species or unidentified species, disseminated or extrapulmonary   Cytomegalovirus disease (other than liver, spleen, or nodes), onset at age >1 month Pneumocystis jirovecii (previously known as Pneumocystis carinii) pneumonia   Cytomegalovirus retinitis (with loss of vision) Pneumonia, recurrent Only among adults, adolescents, and children aged .6 years.   Encephalopathy attributed to HIV Progressive multifocal leukoencephalopathy   Herpes simplex: chronic ulcers (>1 months duration) or bronchitis, pneumonitis, or esophagitis (onset at age >1 month) Salmonella septicemia, recurrent   Histoplasmosis, disseminated or extrapulmonary Toxoplasmosis of brain, onset at age >1 month   Isosporiasis, chronic intestinal (>1 months duration) Wasting syndrome attributed to HIV     [ x] Asymptomatic HIV Infection - Positive Status Only - without any history of (or current) AIDS-Defining Illness or HIV-Related Illness    [ ] HIV Disease / AIDS - Meets the current CDC Definition of AIDS: HIV-infected persons who have less than 200 CD4+ T-lymphocytes/uL, or CD4+ T-lymphocyte percentage of total lymphocytes of less than 14, and/or an AIDS-Defining Illness or HIV-Related Disease (see above).    [ ] Patient is HIV Negative  [ ] Other (please specify): ____________________________________  [ ] Clinically Undetermined    Please document in your progress notes daily for the duration of treatment until resolved and include in your discharge summary.

## 2017-10-07 LAB
BACTERIA BLD CULT: NORMAL
BACTERIA BLD CULT: NORMAL

## 2017-10-19 NOTE — PHYSICIAN QUERY
PT Name: Darcie Bryant  MR #: [id number]     Physician Query Form - Diagnosis Clarification      CDS/: CLAUDIA Campos, RN, CCDS               Contact information: [email or web address]    This form is a permanent document in the medical record.     Query Date: October 19, 2017    By submitting this query, we are merely seeking further clarification of documentation.  Please utilize your independent clinical judgment when addressing the question(s) below.     The medical record contains the following:      Findings Supporting Clinical Information Location in Medical Record   Pneumonia Assessment :   1. SOB   2. Pneumonia   3. HIV   4. ESRD   5. CHF?   6. HTN   7. Anemia   8. Abnormal LFTs   9.Elevated troponin     Study:   X-ray Chest   Findings:   R lower lobe infiltrate suggestive of pneumonia     Subsequently she was found to have severe uncontrolled HTN and fluid overload-- most likely from non compliance with HD and salt and fluid restriction. She was admitted and nephrology consulted who proceeded with urgent HD and about 4.5 L of Fluid removed. Her initial BP were also quiet high and her BP meds were resumed which brought the BP under control. She had also c/o increased R sided abd pain and was worried about ascites, however US abd showed only a small ascites and B small Pleural effusions-- again likely fluid overload from incomplete Dialysis.     Final Active Diagnoses: Diagnosis Date Noted POA    ESRD (end stage renal disease) [N18.6] 10/02/2017 Yes    Seizures [R56.9] 10/03/2017 Yes    Anemia [D64.9] 10/03/2017 Yes    HIV (human immunodeficiency virus infection) [B20] 10/02/2017 Yes     Problems Resolved During this Admission: Diagnosis Date Noted Date Resolved POA    PRINCIPAL PROBLEM: Hypertension with fluid overload [I10, E87.79] 10/03/2017 10/03/2017 Yes    10/2 H&P                                10/3 Discharge Summary     Please clarify if the Pneumonia diagnosis has been:    [ x ] Ruled In  [   ] Ruled Out  [  ] Clinically undetermined  [  ] Other/Clarification of findings (please specify)_______________________________    Please document in your progress notes daily for the duration of treatment, until resolved, and include in your discharge summary.